# Patient Record
Sex: MALE | Race: WHITE | NOT HISPANIC OR LATINO | Employment: FULL TIME | URBAN - METROPOLITAN AREA
[De-identification: names, ages, dates, MRNs, and addresses within clinical notes are randomized per-mention and may not be internally consistent; named-entity substitution may affect disease eponyms.]

---

## 2017-01-02 ENCOUNTER — PATIENT MESSAGE (OUTPATIENT)
Dept: ADMINISTRATIVE | Facility: OTHER | Age: 42
End: 2017-01-02

## 2017-03-15 DIAGNOSIS — Z00.00 ROUTINE GENERAL MEDICAL EXAMINATION AT A HEALTH CARE FACILITY: Primary | ICD-10-CM

## 2017-05-18 DIAGNOSIS — Z00.00 ROUTINE GENERAL MEDICAL EXAMINATION AT A HEALTH CARE FACILITY: Primary | ICD-10-CM

## 2017-07-14 ENCOUNTER — TELEPHONE (OUTPATIENT)
Dept: INTERNAL MEDICINE | Facility: CLINIC | Age: 42
End: 2017-07-14

## 2017-07-14 ENCOUNTER — CLINICAL SUPPORT (OUTPATIENT)
Dept: INTERNAL MEDICINE | Facility: CLINIC | Age: 42
End: 2017-07-14

## 2017-07-14 ENCOUNTER — OFFICE VISIT (OUTPATIENT)
Dept: INTERNAL MEDICINE | Facility: CLINIC | Age: 42
End: 2017-07-14

## 2017-07-14 ENCOUNTER — HOSPITAL ENCOUNTER (OUTPATIENT)
Dept: RADIOLOGY | Facility: HOSPITAL | Age: 42
Discharge: HOME | End: 2017-07-14
Attending: FAMILY MEDICINE

## 2017-07-14 ENCOUNTER — CLINICAL SUPPORT (OUTPATIENT)
Dept: REHABILITATION | Facility: HOSPITAL | Age: 42
End: 2017-07-14
Attending: FAMILY MEDICINE

## 2017-07-14 VITALS
WEIGHT: 211 LBS | HEART RATE: 64 BPM | BODY MASS INDEX: 29.54 KG/M2 | HEIGHT: 71 IN | TEMPERATURE: 97 F | SYSTOLIC BLOOD PRESSURE: 112 MMHG | DIASTOLIC BLOOD PRESSURE: 76 MMHG

## 2017-07-14 VITALS
BODY MASS INDEX: 29.63 KG/M2 | WEIGHT: 211.63 LBS | SYSTOLIC BLOOD PRESSURE: 112 MMHG | HEART RATE: 64 BPM | RESPIRATION RATE: 14 BRPM | HEIGHT: 71 IN | DIASTOLIC BLOOD PRESSURE: 76 MMHG

## 2017-07-14 DIAGNOSIS — Z00.00 ROUTINE GENERAL MEDICAL EXAMINATION AT A HEALTH CARE FACILITY: ICD-10-CM

## 2017-07-14 DIAGNOSIS — Z00.00 ROUTINE GENERAL MEDICAL EXAMINATION AT A HEALTH CARE FACILITY: Primary | ICD-10-CM

## 2017-07-14 DIAGNOSIS — Z00.00 ROUTINE HEALTH MAINTENANCE: Primary | ICD-10-CM

## 2017-07-14 LAB
ALBUMIN SERPL BCP-MCNC: 4.1 G/DL
ALP SERPL-CCNC: 69 U/L
ALT SERPL W/O P-5'-P-CCNC: 44 U/L
ANION GAP SERPL CALC-SCNC: 6 MMOL/L
AST SERPL-CCNC: 23 U/L
BILIRUB SERPL-MCNC: 0.9 MG/DL
BUN SERPL-MCNC: 16 MG/DL
CALCIUM SERPL-MCNC: 9.5 MG/DL
CHLORIDE SERPL-SCNC: 104 MMOL/L
CHOLEST/HDLC SERPL: 5.4 {RATIO}
CO2 SERPL-SCNC: 28 MMOL/L
COMPLEXED PSA SERPL-MCNC: 0.64 NG/ML
CREAT SERPL-MCNC: 1.2 MG/DL
ERYTHROCYTE [DISTWIDTH] IN BLOOD BY AUTOMATED COUNT: 12.4 %
EST. GFR  (AFRICAN AMERICAN): >60 ML/MIN/1.73 M^2
EST. GFR  (NON AFRICAN AMERICAN): >60 ML/MIN/1.73 M^2
ESTIMATED AVG GLUCOSE: 103 MG/DL
GLUCOSE SERPL-MCNC: 100 MG/DL
HBA1C MFR BLD HPLC: 5.2 %
HCT VFR BLD AUTO: 42.4 %
HDL/CHOLESTEROL RATIO: 18.7 %
HDLC SERPL-MCNC: 182 MG/DL
HDLC SERPL-MCNC: 34 MG/DL
HGB BLD-MCNC: 14.7 G/DL
LDLC SERPL CALC-MCNC: 111 MG/DL
MCH RBC QN AUTO: 29.9 PG
MCHC RBC AUTO-ENTMCNC: 34.7 %
MCV RBC AUTO: 86 FL
NONHDLC SERPL-MCNC: 148 MG/DL
PLATELET # BLD AUTO: 193 K/UL
PMV BLD AUTO: 10.5 FL
POTASSIUM SERPL-SCNC: 4.6 MMOL/L
PROT SERPL-MCNC: 7.2 G/DL
RBC # BLD AUTO: 4.92 M/UL
SODIUM SERPL-SCNC: 138 MMOL/L
TRIGL SERPL-MCNC: 185 MG/DL
TSH SERPL DL<=0.005 MIU/L-ACNC: 1.01 UIU/ML
WBC # BLD AUTO: 5.36 K/UL

## 2017-07-14 PROCEDURE — 84443 ASSAY THYROID STIM HORMONE: CPT

## 2017-07-14 PROCEDURE — 80053 COMPREHEN METABOLIC PANEL: CPT | Mod: PO

## 2017-07-14 PROCEDURE — 97750 PHYSICAL PERFORMANCE TEST: CPT | Performed by: PHYSICAL THERAPIST

## 2017-07-14 PROCEDURE — 80061 LIPID PANEL: CPT | Mod: PO

## 2017-07-14 PROCEDURE — 84153 ASSAY OF PSA TOTAL: CPT

## 2017-07-14 PROCEDURE — 75571 CT HRT W/O DYE W/CA TEST: CPT | Mod: TC,PO

## 2017-07-14 PROCEDURE — 85027 COMPLETE CBC AUTOMATED: CPT | Mod: PO

## 2017-07-14 PROCEDURE — 83036 HEMOGLOBIN GLYCOSYLATED A1C: CPT

## 2017-07-14 PROCEDURE — 99396 PREV VISIT EST AGE 40-64: CPT | Mod: ,,, | Performed by: FAMILY MEDICINE

## 2017-07-14 PROCEDURE — 99999 PR PBB SHADOW E&M-EST. PATIENT-LVL II: CPT | Mod: PBBFAC,,, | Performed by: FAMILY MEDICINE

## 2017-07-14 PROCEDURE — 97802 MEDICAL NUTRITION INDIV IN: CPT | Mod: S$GLB,,, | Performed by: INTERNAL MEDICINE

## 2017-07-14 PROCEDURE — 75571 CT HRT W/O DYE W/CA TEST: CPT | Mod: 26,,, | Performed by: RADIOLOGY

## 2017-07-14 NOTE — PROGRESS NOTES
Nutrition Assessment  Client name:  Gideon Ribera Jr.  :  1975  Age:  42 y.o.  Gender:  male    Client states he is here today with his wife for his annual Executive Health medical examination. He admits he has not been as physically active this year as he has in the past. He is very open to discuss how nutrition can benefit his health through a bit of weight loss. His diet consists mainly of what his wife cooks for him (dinner and lunch leftovers) and feels his downfall is late night snacking.     Past Medical History:   Diagnosis Date    Castano esophagus     last egd -  looks normal    GERD (gastroesophageal reflux disease)        Social History    Marital status:    Employment:  [employer]  Social History   Substance Use Topics    Smoking status: Never Smoker    Smokeless tobacco: Never Used    Alcohol use Yes      Comment: occas etoh use        Current medications:  has a current medication list which includes the following prescription(s): pantoprazole.  Vitamins, minerals, and/or supplements:  multivitamin   Food allergies or intolerances:  NKFA     Food History  Breakfast:  Kashi Waffles and Banana + Coffee w milk  Mid-morning Snack:  granola Bar (Nature Vallley w Protein)  Lunch:  Brings lunch from home (leftovers: meat and veg / casserole) + water  Mid-afternoon Snack:  granola bar / banana  Dinner:  Home-cooked meal: Meat, vegetable / casserole  H.S. Snack:  banana/ granola bar/ cookies    Exercise History:  Moderately Active. Walks sporadically    Lab Reports   Total Cholesterol:  182    Triglycerides:  185  HDL:  34  LDL:  111.0   Glucose:  100  HbA1c:  N/A  BP:  112/76     Weight History  Height:  71 inches     Weight:  211 pounds  BMI:  29.5  % Body Fat:  21.86    Diagnosis  RMR (Method:  Body [place]):  1810 kcal  Activity Factor:  1.2  KYLAH:  2100    Altered nutrition-related lab values related to irregular physical activity and late night consumption of high carb, high kcal foods  as evidenced by Triglycerides 185, HDL 34.    Intervention    Goals:  1.  Adhere to a 2100 kcal consistent-carbohydrate diet of three meals and three snacks.  2.  Increase physical activity in daily life.  3.  Limit high-sugar foods after dinner.    Discussed with client basic nutrition principles to adhere to a more consistent-carbohydrate diet. Educated him on macronutrient distribution for meals and snacks and provided him with specific samples of snacks that can fit criteria. Encouraged him to live a more active lifestyle.     Handouts provided:  Meal Planning Guide  Restaurant Guide  Eat Fit Shopping List  Eat Fit Carmencita  Fast Food Guide  Vitamin/Mineral Guide    Monitoring/Evaluation    Monitor the following:  Weight  BMI  % Body Fat  Caloric intake  Labs:  Trig, HDL.    Follow Up Plan:  Follow up with client in 1-2 years

## 2017-07-14 NOTE — PROGRESS NOTES
Subjective:       Patient ID: Gideon Ribera Jr. is a 42 y.o. male.    Chief Complaint: exec health phys    HPIhere for exec health phys. No new issues.   Past Medical History:   Diagnosis Date    Castano esophagus     last egd - 2014 looks normal    GERD (gastroesophageal reflux disease)      Past Surgical History:   Procedure Laterality Date    egd  2014     Family History   Problem Relation Age of Onset    Crohn's disease Mother     Cancer Father      mantle cell lymphoma at 60    Diabetes Maternal Grandmother     Diabetes Paternal Grandmother     Diabetes Paternal Grandfather     Prostate cancer Paternal Grandfather      Social History     Social History    Marital status:      Spouse name: N/A    Number of children: 1    Years of education: N/A     Occupational History    [email or web address]          Social History Main Topics    Smoking status: Never Smoker    Smokeless tobacco: Never Used    Alcohol use Yes      Comment: occas etoh use    Drug use: No    Sexual activity: Yes     Partners: Female     Other Topics Concern    None     Social History Narrative     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 He appears well-developed and well-nourished.   HENT:   Head: Normocephalic and atraumatic.   Right Ear: External ear normal.   Left Ear: External ear normal.   Nose: Nose normal.   Mouth/Throat: Oropharynx is clear and moist.   Nl tms   Eyes: Conjunctivae and EOM are normal. Pupils are equal, round, and reactive to light. No scleral icterus.   Neck: Normal range of motion. Neck supple. Carotid bruit is not present.   Cardiovascular: Normal rate, regular rhythm and normal heart sounds.  Exam reveals no gallop and no friction rub.     No murmur heard.  Pulmonary/Chest: Effort normal and breath sounds normal. He has no wheezes.   Abdominal: Soft. Bowel sounds are normal. He exhibits no distension and no mass. There is no hepatosplenomegaly. There is no tenderness. There is no rebound and no guarding.   Musculoskeletal: Normal range of motion. He exhibits no tenderness.   Lymphadenopathy:     He has no cervical adenopathy.   Neurological: He is alert and oriented to person, place, and time. No cranial nerve deficit. Coordination normal.   Skin: Skin is warm and dry. No rash noted. No erythema.   Psychiatric: He has a normal mood and affect. His behavior is normal. Judgment and thought content normal.   Nursing note and vitals reviewed.      Assessment:       1. Routine health maintenance      hx barretts  Plan:       **d/wd egd due 12/17  F/u annually  exerc /wt loss discussed  Letter after rsults*  [facility] #[id number]    Dear Mr. Ribera:    It was a pleasure to meet with you again to perform this year's executive   physical examination.  At the time of your visit, you had no specific   complaints.    PAST MEDICAL HISTORY:  Castano's esophagus, last EGD in 2014 looks normal, GERD.    PAST SURGICAL HISTORY:  EGD in 2014.    FAMILY HISTORY:  Mother with Crohn's disease.  Father with history of mantle   cell lymphoma at 60.  Diabetes in grandparents.  Prostate cancer in paternal   grandfather.    SOCIAL HISTORY:   with [employer].   with child.    HABITS:  Nonsmoker.    IMMUNIZATIONS:  Tetanus, whooping cough booster given July 2016.    MEDICATIONS:  Protonix.    ALLERGIES:  No known drug allergies.    REVIEW OF SYSTEMS:  CARDIOVASCULAR:  No chest pain.  CHEST:  No shortness of breath.  GASTROINTESTINAL:  No abdominal pain.  Remainder of review of systems are negative.    PHYSICAL EXAMINATION:  VITAL SIGNS:  Blood pressure 112/76, temperature 97, heart rate 64 beats per   minute, weight 211 pounds, height 5 feet 11 inches with a body  mass index of   29.43 where a BMI of 30 or higher indicates obesity.  GENERAL:  Alert and oriented, pleasant.  HEAD AND NECK:  Both eardrums and ear canals are clear.  Nasal passage is clear.    Oropharyngeal exam without redness, lesions or exudate.  NECK:  Supple.  No lymph node enlargement.  No masses.  No carotid bruit.  HEART:  Regular rate and rhythm.  CHEST:  Clear bilaterally.  ABDOMEN:  Positive bowel sounds, soft, nontender, nondistended.  No organ   enlargement.  No masses.  MUSCULOSKELETAL:  No focal joint pains or swelling.  NEUROLOGIC:  Cranial nerves II-XII are intact.  Motor 5/5 in both upper and   lower extremities.  Symmetrical reflexes.    LABORATORY DATA:  PSA, prostate cancer screen test normal at 0.64.  Kidney   function test, creatinine normal at 1.2.  Liver function test:  AST and ALT both   normal.  White blood cell count normal at 5.36 indicating no leukemia or   obvious infection.  Hematocrit red blood cell count 42.4 indicating no anemia.    Total cholesterol 182, improved from last year's 192, triglycerides a bit   elevated at 185, a goal of less than 150.  HDL or good cholesterol low at 34,   stable.  LDL or bad cholesterol normal at 111.  TSH or thyroid test 1.    Hemoglobin A1c 5.2.  Your three-month sugar average, indicating no diabetes.    Coronary calcium scan shows Agatston score of 6, which is minimal to no heart   disease likely.    IMPRESSION:  1.  Executive physical examination.  2.  History of Castano's esophagus with controlled GERD.  3.  Borderline obesity.    PLAN:  1.  Exercise, weight loss.  2.  Discussed EGD or upper endoscopy due December 2017 to monitor for recurrence   of Castano's esophagus.  3.  Follow up annually.    It was a pleasure to meet with you again and perform your executive physical   examination.  If you have any further questions, please contact me.    Sincerely,    MD DANNIELLE Muro/PRIETO  dd: 08/10/2017 08:51:51 (CDT)  td: 08/10/2017 14:28:07  (CDT)  Doc ID   #[id number]  Job ID #[id number]    CC:

## 2017-07-14 NOTE — PROGRESS NOTES
:  77    DX: v70.0  Orders:  Fitness Evaluation    Patient's 4th [facility] Fitness Component evaluation was completed.  Results are as follows:    Height (in):    71                            Weight (lbs):    211                                    BMI:   29.5    Resting Energy Expenditure:         1810       kcal/24 hrs.              Estimated:    1889     REE measured post treadmill:          No   REE measured fasting:       Yes           Body Composition:          21.86  % body fat             Rating: Good    Waist to Hip Ratio:     0.88                    Risk:  Low   Hip taken over clothing:      Yes          Muscular Strength and Endurance Assessment:               Strength (lbs):     Right: 139.7             Rating:  Above Average      Left:  125.3           Rating: Average   Push-ups:   32                 Rating:  Excellent   Curl-ups :   31                Rating:  Below Average    Flexibility Testing:   Sit and Reach (cm):    20                       Rating:  Fair    The patient tolerated the testing procedures well.

## 2018-03-26 DIAGNOSIS — Z00.00 ROUTINE GENERAL MEDICAL EXAMINATION AT A HEALTH CARE FACILITY: Primary | ICD-10-CM

## 2018-05-22 ENCOUNTER — PATIENT OUTREACH (OUTPATIENT)
Dept: ADMINISTRATIVE | Facility: HOSPITAL | Age: 43
End: 2018-05-22

## 2018-06-01 ENCOUNTER — PATIENT MESSAGE (OUTPATIENT)
Dept: GASTROENTEROLOGY | Facility: CLINIC | Age: 43
End: 2018-06-01

## 2018-06-01 ENCOUNTER — NUTRITION (OUTPATIENT)
Dept: DIABETES | Facility: CLINIC | Age: 43
End: 2018-06-01

## 2018-06-01 ENCOUNTER — CLINICAL SUPPORT (OUTPATIENT)
Dept: CARDIOLOGY | Facility: CLINIC | Age: 43
End: 2018-06-01

## 2018-06-01 ENCOUNTER — OFFICE VISIT (OUTPATIENT)
Dept: INTERNAL MEDICINE | Facility: CLINIC | Age: 43
End: 2018-06-01

## 2018-06-01 ENCOUNTER — CLINICAL SUPPORT (OUTPATIENT)
Dept: INTERNAL MEDICINE | Facility: CLINIC | Age: 43
End: 2018-06-01
Payer: COMMERCIAL

## 2018-06-01 VITALS
DIASTOLIC BLOOD PRESSURE: 70 MMHG | HEIGHT: 71 IN | HEART RATE: 60 BPM | WEIGHT: 205.69 LBS | RESPIRATION RATE: 16 BRPM | SYSTOLIC BLOOD PRESSURE: 108 MMHG | BODY MASS INDEX: 28.8 KG/M2

## 2018-06-01 VITALS
OXYGEN SATURATION: 99 % | HEIGHT: 71 IN | TEMPERATURE: 97 F | BODY MASS INDEX: 28.8 KG/M2 | DIASTOLIC BLOOD PRESSURE: 70 MMHG | HEART RATE: 60 BPM | WEIGHT: 205.69 LBS | RESPIRATION RATE: 14 BRPM | SYSTOLIC BLOOD PRESSURE: 108 MMHG

## 2018-06-01 VITALS — WEIGHT: 207.25 LBS | BODY MASS INDEX: 29.02 KG/M2 | HEIGHT: 71 IN

## 2018-06-01 DIAGNOSIS — K22.70 BARRETT'S ESOPHAGUS WITHOUT DYSPLASIA: ICD-10-CM

## 2018-06-01 DIAGNOSIS — Z00.8 NUTRITIONAL ASSESSMENT: Primary | ICD-10-CM

## 2018-06-01 DIAGNOSIS — Z00.00 ROUTINE GENERAL MEDICAL EXAMINATION AT A HEALTH CARE FACILITY: ICD-10-CM

## 2018-06-01 DIAGNOSIS — K21.9 GASTROESOPHAGEAL REFLUX DISEASE WITHOUT ESOPHAGITIS: ICD-10-CM

## 2018-06-01 DIAGNOSIS — Z00.00 ROUTINE GENERAL MEDICAL EXAMINATION AT A HEALTH CARE FACILITY: Primary | ICD-10-CM

## 2018-06-01 LAB
ALBUMIN SERPL BCP-MCNC: 4.2 G/DL
ALP SERPL-CCNC: 69 U/L
ALT SERPL W/O P-5'-P-CCNC: 15 U/L
ANION GAP SERPL CALC-SCNC: 8 MMOL/L
AST SERPL-CCNC: 17 U/L
BILIRUB SERPL-MCNC: 0.7 MG/DL
BUN SERPL-MCNC: 16 MG/DL
CALCIUM SERPL-MCNC: 9.6 MG/DL
CHLORIDE SERPL-SCNC: 106 MMOL/L
CHOLEST SERPL-MCNC: 163 MG/DL
CHOLEST/HDLC SERPL: 4.5 {RATIO}
CO2 SERPL-SCNC: 26 MMOL/L
COMPLEXED PSA SERPL-MCNC: 0.86 NG/ML
CREAT SERPL-MCNC: 1.2 MG/DL
ERYTHROCYTE [DISTWIDTH] IN BLOOD BY AUTOMATED COUNT: 12.4 %
EST. GFR  (AFRICAN AMERICAN): >60 ML/MIN/1.73 M^2
EST. GFR  (NON AFRICAN AMERICAN): >60 ML/MIN/1.73 M^2
ESTIMATED AVG GLUCOSE: 103 MG/DL
GLUCOSE SERPL-MCNC: 97 MG/DL
HBA1C MFR BLD HPLC: 5.2 %
HCT VFR BLD AUTO: 41.8 %
HDLC SERPL-MCNC: 36 MG/DL
HDLC SERPL: 22.1 %
HGB BLD-MCNC: 14.6 G/DL
LDLC SERPL CALC-MCNC: 105.8 MG/DL
MCH RBC QN AUTO: 30.4 PG
MCHC RBC AUTO-ENTMCNC: 34.9 G/DL
MCV RBC AUTO: 87 FL
NONHDLC SERPL-MCNC: 127 MG/DL
PLATELET # BLD AUTO: 191 K/UL
PMV BLD AUTO: 11.4 FL
POTASSIUM SERPL-SCNC: 4.2 MMOL/L
PROT SERPL-MCNC: 7.1 G/DL
RBC # BLD AUTO: 4.8 M/UL
SODIUM SERPL-SCNC: 140 MMOL/L
TRIGL SERPL-MCNC: 106 MG/DL
TSH SERPL DL<=0.005 MIU/L-ACNC: 0.82 UIU/ML
WBC # BLD AUTO: 4.79 K/UL

## 2018-06-01 PROCEDURE — 85027 COMPLETE CBC AUTOMATED: CPT | Mod: PO

## 2018-06-01 PROCEDURE — 80061 LIPID PANEL: CPT | Mod: PO

## 2018-06-01 PROCEDURE — 83036 HEMOGLOBIN GLYCOSYLATED A1C: CPT

## 2018-06-01 PROCEDURE — 93000 ELECTROCARDIOGRAM COMPLETE: CPT | Mod: ,,, | Performed by: INTERNAL MEDICINE

## 2018-06-01 PROCEDURE — 84153 ASSAY OF PSA TOTAL: CPT

## 2018-06-01 PROCEDURE — 99999 PR PBB SHADOW E&M-EST. PATIENT-LVL II: CPT | Mod: PBBFAC,,, | Performed by: DIETITIAN, REGISTERED

## 2018-06-01 PROCEDURE — 80053 COMPREHEN METABOLIC PANEL: CPT | Mod: PO

## 2018-06-01 PROCEDURE — 99999 PR PBB SHADOW E&M-EST. PATIENT-LVL III: CPT | Mod: PBBFAC,,, | Performed by: INTERNAL MEDICINE

## 2018-06-01 PROCEDURE — 99386 PREV VISIT NEW AGE 40-64: CPT | Mod: ,,, | Performed by: INTERNAL MEDICINE

## 2018-06-01 PROCEDURE — 97802 MEDICAL NUTRITION INDIV IN: CPT | Mod: ,,, | Performed by: DIETITIAN, REGISTERED

## 2018-06-01 PROCEDURE — 84443 ASSAY THYROID STIM HORMONE: CPT

## 2018-06-01 NOTE — PROGRESS NOTES
"PCP: Josesito Dunn MD   REFERRING PROVIDER: No ref. provider found     HISTORY OF PRESENT ILLNESS: 43 y.o. male patient is in clinic today for executive health visit.    No weight changes reported.     VITAL SIGNS:  Height: 5' 11" (180.3 cm)   Weight: 94 kg (207 lb 3.7 oz)   IBW: 172 lbs +/-10%    ALLERGIES & MEDICATIONS: Reviewed and Reconciled  MEDICAL/SURGICAL & FAMILY HISTORY: Reviewed and Reconciled    LABORATORY:  Reviewed Diabetes Management Flowsheet and Results Review.    SELF-MONITORING: Food - none are avail    ACTIVITY LEVEL: sporadic - some weeks better than others    NUTRITION INTAKE: Meal patterns include 3 meals, 1-2 snacks daily with intake ~1153-2305 cals/d.   B - yogurt, oatmeal - coffee w/ creamer  S - none  L - steak marsala @ [employer], mashed potato - uns tea  S - none  D - 2 Hawaiian roll meatball sandwiches - water  S - none  Beverages - unsweetened tea, water  Dining out - 2-3x/week    PSYCHOSOCIAL: Stage of change - contemplation  Barriers to change - readiness.    EDUCATIONAL ASSESSMENT:  Patient needs improvement in self care management skills:    Healthy Eating   Being Active   Monitoring  Taking medications  Problem solving  Healthy coping  Reducing risks    MNT ASSESSMENT:   1800 calories, 75-85 grams protein daily  45 grams carb/meal, 5-15 grams carb/snack  increase fruit 2 serv/d, vegetables 2+ cups/d, whole grains 3+serv/d, lowfat dairy 3+serv/d  low-fat, low-sodium  150 min physical activity per week, moderate intensity, as tolerated    PLAN:   Reviewed MNT guidelines for general wellness.    Encouraged daily self-monitoring of food and activity patterns, return records to clinic.   Provided meal-planning instruction via foodlists, plate method, food models, food labels and/or ADA booklet. Reviewed micro/macronutrient effect on health management. Discussed carbohydrate counting and spacing techniques with emphasis on cardiovascular wellness health strategies, functional " foods. Reviewed principles of energy metabolism to assist weight and health management.    Discussed importance of daily physical activity with review of benefits, methods, guidelines and precautions.   Discussed behavioral strategies for improving social and environmental support of lifestyle changes.    GOALS: Self-monitoring: Daily food & activity journal. Meal Plan: 90% Accuracy. Activity:150 min/wk.    Visit Time Spent:  60 minutes  Thank you for the opportunity to work with your patient.

## 2018-06-01 NOTE — PROGRESS NOTES
"Subjective:      Patient ID: Gideon Ribera Jr. is a 43 y.o. male.    Chief Complaint: Executive Health    It was a pleasure to meet you for your Executive Health  Physical on 18.  You are a 43 year-old  gentleman with a past medical history of acid reflux  with Castano's esophagus.  Your last EGD was done in   and you are due for a repeat.  You are  currently working as an  for [employer].  You are   with one healthy child.  You do not smoke and  you occasionally drink alcohol.  Your current  medications include Protonix only.  You have no known  drug allergies.     Your family history includes a father who was recently   at 61 years old with mantle cell lymphoma.   Your mother is 61 years old with prediabetes and Crohn  disease.  Your maternal grandmother and paternal  grandmothers had a history of diabetes.  Your paternal  grandfather had diabetes and prostate cancer.     HEALTH MAINTENANCE:  You usually receive your annual  flu vaccine and your tetanus, diphtheria, and whooping  cough vaccine was updated on 17     due for egd due to Castano's  Review of Systems   Constitutional: Negative for chills and fever.   HENT: Negative for ear pain and sore throat.    Respiratory: Negative for cough.    Cardiovascular: Negative for chest pain.   Gastrointestinal: Negative for abdominal pain and blood in stool.   Genitourinary: Negative for dysuria and hematuria.   Neurological: Negative for seizures and syncope.     Objective:   /70 (BP Location: Right arm, Patient Position: Sitting)   Pulse 60   Temp 96.6 °F (35.9 °C) (Tympanic)   Resp 14   Ht 5' 11" (1.803 m)   Wt 93.3 kg (205 lb 11 oz)   SpO2 99%   BMI 28.69 kg/m²     Physical Exam   Constitutional: He is oriented to person, place, and time. He appears well-developed and well-nourished. No distress.   HENT:   Head: Normocephalic and atraumatic.   Mouth/Throat: Oropharynx is clear and moist.   Eyes: EOM are normal. Pupils " are equal, round, and reactive to light.   Neck: Neck supple. No thyromegaly present.   Cardiovascular: Normal rate and regular rhythm.    Pulmonary/Chest: Breath sounds normal. He has no wheezes. He has no rales.   Abdominal: Soft. Bowel sounds are normal. There is no tenderness.   Musculoskeletal: He exhibits no edema.   Lymphadenopathy:     He has no cervical adenopathy.   Neurological: He is alert and oriented to person, place, and time.   Skin: Skin is warm and dry.   Psychiatric: He has a normal mood and affect. His behavior is normal.       Assessment:     1. Routine general medical examination at a health care facility    2. Gastroesophageal reflux disease without esophagitis    3. Castano's esophagus without dysplasia      Plan:   Routine general medical examination at a health care facility    Gastroesophageal reflux disease without esophagitis    Castano's esophagus without dysplasia  -     Case request GI: ESOPHAGOGASTRODUODENOSCOPY (EGD)    Heart healthy diet and reg exercise   reviewed    Lab Frequency Next Occurrence       Problem List Items Addressed This Visit        GI    GERD (gastroesophageal reflux disease)      Other Visit Diagnoses     Routine general medical examination at a health care facility    -  Primary    Castano's esophagus without dysplasia        Relevant Orders    Case request GI: ESOPHAGOGASTRODUODENOSCOPY (EGD) (Completed)          Follow-up if symptoms worsen or fail to improve.

## 2018-07-27 ENCOUNTER — ANESTHESIA EVENT (OUTPATIENT)
Dept: ENDOSCOPY | Facility: HOSPITAL | Age: 43
End: 2018-07-27
Payer: COMMERCIAL

## 2018-07-27 ENCOUNTER — ANESTHESIA (OUTPATIENT)
Dept: ENDOSCOPY | Facility: HOSPITAL | Age: 43
End: 2018-07-27
Payer: COMMERCIAL

## 2018-07-27 ENCOUNTER — SURGERY (OUTPATIENT)
Age: 43
End: 2018-07-27

## 2018-07-27 ENCOUNTER — HOSPITAL ENCOUNTER (OUTPATIENT)
Facility: HOSPITAL | Age: 43
Discharge: HOME | End: 2018-07-27
Attending: INTERNAL MEDICINE | Admitting: INTERNAL MEDICINE
Payer: COMMERCIAL

## 2018-07-27 VITALS
HEART RATE: 71 BPM | DIASTOLIC BLOOD PRESSURE: 77 MMHG | BODY MASS INDEX: 28.84 KG/M2 | WEIGHT: 206 LBS | HEIGHT: 71 IN | OXYGEN SATURATION: 97 % | RESPIRATION RATE: 18 BRPM | TEMPERATURE: 98 F | SYSTOLIC BLOOD PRESSURE: 115 MMHG

## 2018-07-27 DIAGNOSIS — K22.70 BARRETT'S ESOPHAGUS: ICD-10-CM

## 2018-07-27 PROCEDURE — 37000008 HC ANESTHESIA 1ST 15 MINUTES: Performed by: INTERNAL MEDICINE

## 2018-07-27 PROCEDURE — 43239 EGD BIOPSY SINGLE/MULTIPLE: CPT | Mod: ,,, | Performed by: INTERNAL MEDICINE

## 2018-07-27 PROCEDURE — 88305 TISSUE EXAM BY PATHOLOGIST: CPT | Mod: 26,,, | Performed by: PATHOLOGY

## 2018-07-27 PROCEDURE — 37000009 HC ANESTHESIA EA ADD 15 MINS: Performed by: INTERNAL MEDICINE

## 2018-07-27 PROCEDURE — 27201012 HC FORCEPS, HOT/COLD, DISP: Performed by: INTERNAL MEDICINE

## 2018-07-27 PROCEDURE — 88305 TISSUE EXAM BY PATHOLOGIST: CPT | Performed by: PATHOLOGY

## 2018-07-27 PROCEDURE — 25000003 PHARM REV CODE 250: Performed by: INTERNAL MEDICINE

## 2018-07-27 PROCEDURE — 43239 EGD BIOPSY SINGLE/MULTIPLE: CPT | Performed by: INTERNAL MEDICINE

## 2018-07-27 PROCEDURE — 63600175 PHARM REV CODE 636 W HCPCS: Performed by: NURSE ANESTHETIST, CERTIFIED REGISTERED

## 2018-07-27 RX ORDER — SODIUM CHLORIDE 0.9 % (FLUSH) 0.9 %
3 SYRINGE (ML) INJECTION
Status: DISCONTINUED | OUTPATIENT
Start: 2018-07-27 | End: 2018-07-27 | Stop reason: HOSPADM

## 2018-07-27 RX ORDER — LIDOCAINE HCL/PF 100 MG/5ML
SYRINGE (ML) INTRAVENOUS
Status: DISCONTINUED | OUTPATIENT
Start: 2018-07-27 | End: 2018-07-27

## 2018-07-27 RX ORDER — PROPOFOL 10 MG/ML
INJECTION, EMULSION INTRAVENOUS
Status: DISCONTINUED | OUTPATIENT
Start: 2018-07-27 | End: 2018-07-27

## 2018-07-27 RX ORDER — SODIUM CHLORIDE, SODIUM LACTATE, POTASSIUM CHLORIDE, CALCIUM CHLORIDE 600; 310; 30; 20 MG/100ML; MG/100ML; MG/100ML; MG/100ML
INJECTION, SOLUTION INTRAVENOUS CONTINUOUS
Status: DISCONTINUED | OUTPATIENT
Start: 2018-07-27 | End: 2018-07-27 | Stop reason: HOSPADM

## 2018-07-27 RX ADMIN — LIDOCAINE HYDROCHLORIDE 100 MG: 20 INJECTION, SOLUTION INTRAVENOUS at 09:07

## 2018-07-27 RX ADMIN — PROPOFOL 60 MG: 10 INJECTION, EMULSION INTRAVENOUS at 09:07

## 2018-07-27 RX ADMIN — SODIUM CHLORIDE, SODIUM LACTATE, POTASSIUM CHLORIDE, AND CALCIUM CHLORIDE: 600; 310; 30; 20 INJECTION, SOLUTION INTRAVENOUS at 09:07

## 2018-07-27 RX ADMIN — PROPOFOL 130 MG: 10 INJECTION, EMULSION INTRAVENOUS at 09:07

## 2018-07-27 RX ADMIN — PROPOFOL 60 MG: 10 INJECTION, EMULSION INTRAVENOUS at 10:07

## 2018-07-27 RX ADMIN — SODIUM CHLORIDE, SODIUM LACTATE, POTASSIUM CHLORIDE, AND CALCIUM CHLORIDE: 600; 310; 30; 20 INJECTION, SOLUTION INTRAVENOUS at 08:07

## 2018-07-27 RX ADMIN — PROPOFOL 70 MG: 10 INJECTION, EMULSION INTRAVENOUS at 09:07

## 2018-07-27 NOTE — INTERVAL H&P NOTE
The patient has been examined and the H&P has been reviewed:I have reviewed this note and I agree with this assessment. The patient remains stable for endoscopy at the time of this present evaluation.         Anesthesia/Surgery risks, benefits and alternative options discussed and understood by patient/family.          Active Hospital Problems    Diagnosis  POA    Castano's esophagus [K22.70]  Yes      Resolved Hospital Problems    Diagnosis Date Resolved POA   No resolved problems to display.

## 2018-07-27 NOTE — H&P
Short Stay Endoscopy History and Physical    PCP - Josesito Dunn MD    Procedure - EGD  ASA - 2  Mallampati - per anesthesia  History of Anesthesia problems - no  Family history Anesthesia problems -  no     HPI:  This is a 43 y.o.male here for evaluation of : Castano's Esophagus    Reflux - yes  Dysphagia - no  Abdominal pain - no  Diarrhea - no  Anemia - no  GI bleeding - no  Nausea and vomiting-no  Early satiety-no  aversion to sight or smell of food-no    ROS:  Constitutional: No fevers, chills, No weight loss  ENT: No allergies  CV: No chest pain  Pulm: No cough, No shortness of breath  Ophtho: No vision changes  GI: see HPI  Derm: No rash  Heme: No lymphadenopathy, No bruising  MSK: No arthritis  : No dysuria, No hematuria  Endo: No hot or cold intolerance  Neuro: No syncope, No seizure  Psych: No anxiety, No depression    Medical History:  Past Medical History:   Diagnosis Date    Castano esophagus     last egd - 2014 looks normal    GERD (gastroesophageal reflux disease)        Surgical History:  Past Surgical History:   Procedure Laterality Date    egd  2014       Family History:  Family History   Problem Relation Age of Onset    Crohn's disease Mother     Cancer Father         mantle cell lymphoma at 60    Diabetes Maternal Grandmother     Diabetes Paternal Grandmother     Diabetes Paternal Grandfather     Prostate cancer Paternal Grandfather        Social History:  Social History     Social History    Marital status:      Spouse name: N/A    Number of children: 1    Years of education: N/A     Occupational History    [email or web address]          Social History Main Topics    Smoking status: Never Smoker    Smokeless tobacco: Never Used    Alcohol use Yes      Comment: occas etoh use    Drug use: No    Sexual activity: Yes     Partners: Female     Other Topics Concern    Not on file     Social History Narrative     with one child and works as an  ; [employer]       Allergies: Review of patient's allergies indicates:  No Known Allergies    Medications:   No current facility-administered medications on file prior to encounter.      Current Outpatient Prescriptions on File Prior to Encounter   Medication Sig Dispense Refill    pantoprazole (PROTONIX) 20 MG tablet Take 2 tablets (40 mg total) by mouth once daily. (Patient taking differently: Take 20 mg by mouth as needed. ) 30 tablet 11       Objective Findings:    Vital Signs:There were no vitals filed for this visit.        Physical Exam:  General Appearance: Well appearing in no acute distress  Eyes:    No scleral icterus  ENT: Neck supple, Lips, mucosa, and tongue normal; teeth and gums normal  Lungs: CTA bilaterally in anterior and posterior fields, no wheezes, no crackles.  Heart:  Regular rate, S1, S2 normal, no murmurs heard.  Abdomen: Soft, non tender, non distended with normal bowel sounds. No hepatosplenomegaly, ascites, or mass.  Extremities: No clubbing, cyanosis or edema  Skin: No rash    Labs:  Reviewed    Plan:EGD  I have explained the risks and benefits of endoscopy procedures to the patient including but not limited to bleeding, perforation, infection, and death. The patient wishes to proceed.

## 2018-07-27 NOTE — ANESTHESIA RELEASE NOTE
"Anesthesia Release from PACU Note    Patient: Gideon Ribera Jr.    Procedure(s) Performed: Procedure(s) (LRB):  ESOPHAGOGASTRODUODENOSCOPY (EGD) (N/A)    Anesthesia type: MAC    Post pain: Adequate analgesia    Post assessment: no apparent anesthetic complications, tolerated procedure well and no evidence of recall    Last Vitals:   Visit Vitals  /62 (BP Location: Left arm, Patient Position: Lying)   Pulse 76   Temp 36.8 °C (98.2 °F) (Oral)   Resp 16   Ht 5' 11" (1.803 m)   Wt 93.4 kg (206 lb)   SpO2 95%   BMI 28.73 kg/m²       Post vital signs: stable    Level of consciousness: awake and alert     Nausea/Vomiting: no nausea/no vomiting    Complications: none    Airway Patency: patent    Respiratory: unassisted, spontaneous ventilation, room air    Cardiovascular: stable    Hydration: euvolemic  "

## 2018-07-27 NOTE — ANESTHESIA POSTPROCEDURE EVALUATION
"Anesthesia Post Evaluation    Patient: Gideon Ribera Jr.    Procedure(s) Performed: Procedure(s) (LRB):  ESOPHAGOGASTRODUODENOSCOPY (EGD) (N/A)    Final Anesthesia Type: MAC  Patient location during evaluation: PACU  Patient participation: Yes- Able to Participate  Level of consciousness: awake and alert and oriented  Post-procedure vital signs: reviewed and stable  Pain management: adequate  Airway patency: patent  PONV status at discharge: No PONV  Anesthetic complications: no      Cardiovascular status: blood pressure returned to baseline  Respiratory status: unassisted, room air and spontaneous ventilation  Hydration status: euvolemic  Follow-up not needed.        Visit Vitals  /62 (BP Location: Left arm, Patient Position: Lying)   Pulse 76   Temp 36.8 °C (98.2 °F) (Oral)   Resp 16   Ht 5' 11" (1.803 m)   Wt 93.4 kg (206 lb)   SpO2 95%   BMI 28.73 kg/m²       Pain/Petey Score: Pain Assessment Performed: Yes (7/27/2018 10:11 AM)  Presence of Pain: denies (7/27/2018 10:11 AM)  Petey Score: 10 (7/27/2018 10:11 AM)      "

## 2018-07-27 NOTE — TRANSFER OF CARE
"Anesthesia Transfer of Care Note    Patient: Gideon Ribera Jr.    Procedure(s) Performed: Procedure(s) (LRB):  ESOPHAGOGASTRODUODENOSCOPY (EGD) (N/A)    Patient location: PACU    Anesthesia Type: MAC    Transport from OR: Transported from OR on room air with adequate spontaneous ventilation    Post pain: adequate analgesia    Post assessment: no apparent anesthetic complications    Post vital signs: stable    Level of consciousness: sedated    Nausea/Vomiting: no nausea/vomiting    Complications: none    Transfer of care protocol was followed      Last vitals:   Visit Vitals  /62 (BP Location: Left arm, Patient Position: Lying)   Pulse 76   Temp 36.8 °C (98.2 °F) (Oral)   Resp 16   Ht 5' 11" (1.803 m)   Wt 93.4 kg (206 lb)   SpO2 95%   BMI 28.73 kg/m²     "

## 2018-07-27 NOTE — ANESTHESIA PREPROCEDURE EVALUATION
07/27/2018  Gideon Ribera Jr. is a 43 y.o., male.    Anesthesia Evaluation    I have reviewed the Patient Summary Reports.    I have reviewed the Nursing Notes.   I have reviewed the Medications.     Review of Systems  Anesthesia Hx:  No problems with previous Anesthesia    Social:  Alcohol Use, Former Smoker    Hematology/Oncology:  Hematology Normal   Oncology Normal     EENT/Dental:EENT/Dental Normal   Cardiovascular:   Exercise tolerance: good ECG has been reviewed. Sinus bradycardia  Incomplete right bundle branch block  Borderline Abnormal ECG  When compared with ECG of 14-JUL-2016 08:41,  Previous ECG has undetermined rhythm, needs review  Confirmed by FANTA DAY MD ([id number]) on 6/2/2018 7:30:57 AM   Pulmonary:   Snore   Hepatic/GI:   GERD, well controlled Castano's esophagus without dysplasia  2200 last drink of fluid.  1800 last meal.   Musculoskeletal:  Musculoskeletal Normal    Neurological:   Neuromuscular Disease, Seizures, well controlled    Endocrine:  Endocrine Normal    Dermatological:  Skin Normal    Psych:  Psychiatric Normal           Physical Exam  General:  Well nourished    Airway/Jaw/Neck:  Airway Findings: Mallampati: III                Anesthesia Plan  Type of Anesthesia, risks & benefits discussed:  Anesthesia Type:  MAC  Patient's Preference:   Intra-op Monitoring Plan:   Intra-op Monitoring Plan Comments:   Post Op Pain Control Plan:   Post Op Pain Control Plan Comments:   Induction:   IV  Beta Blocker:  Patient is not currently on a Beta-Blocker (No further documentation required).       Informed Consent: Patient understands risks and agrees with Anesthesia plan.  Questions answered. Anesthesia consent signed with patient.  ASA Score: 2     Day of Surgery Review of History & Physical: I have interviewed and examined the patient. I have reviewed the patient's H&P dated:  07/27/18. There are no significant changes.          Ready For Surgery From Anesthesia Perspective.

## 2018-07-27 NOTE — DISCHARGE INSTRUCTIONS
What Is Castano Esophagus?          You have Castano esophagus. This means that there have been changes to the lining of the esophagus near the stomach. The changes may have been caused by the acid reflux that happens with GERD (gastroesophageal reflux disease). The changed lining is not cancerous, but may increase your chances of developing cancer later on.      When you have GERD  The esophagus is the tube that carries food and liquid from the mouth to the stomach. Your lower esophageal sphincter (LES) is a one-way valve at the top of the stomach. It keeps food and stomach acid from flowing backward. If the LES is weakened, food and stomach acid flow back (reflux) into your esophagus. If this happens often, the condition is called GERD.  Changes in the lining  The stomach is kept safe from its own acid by a special lining. The esophagus isnt meant to contact stomach acid. With GERD, acid flows back into the esophagus often. This damages the esophagus. In response to the damage, new tissue forms that is not normal. This is Castano esophagus. The new tissue may keep changing. This is why it is more likely to become cancer in the future.  Preventing further damage  Your healthcare provider may suggest regular tests to keep track of changes in the esophagus. This usually includes an endoscopy, when a flexible lighted scope is placed through the mouth into the esophagus. Biopsies (tissue samples) can be taken of the abnormal areas. You are usually sedated with an IV medicine for comfort. He or she may also suggest ways for you to control GERD. This includes lifestyle changes, medicine, or even surgery. This should help keep your Castano esophagus from getting worse.  Symptoms of GERD  Symptoms include the following:  · Heartburn  · Sour-tasting fluid backing up into your mouth  · Frequent burping or belching  · Symptoms that get worse after you eat, bend over, or lie down  · Coughing repeatedly to clear your  throat  · Hoarseness   Date Last Reviewed: 6/1/2016  © 3958-7705 [employer]. [address] [place] [address], [place] [address]. All rights reserved. This information is not intended as a substitute for professional medical care. Always follow your healthcare professional's instructions.        Gastritis (Adult)    Gastritis is inflammation and irritation of the stomach lining. It can be present for a short time (acute) or be long lasting (chronic). Gastritis is often caused by infection with bacteria called H pylori. More than a third of people in the  have this bacteria in their bodies. In many cases, H pylori causes no problems or symptoms. In some people, though, the infection irritates the stomach lining and causes gastritis. Other causes of stomach irritation include drinking alcohol or taking pain-relieving medicines called NSAIDs (such as aspirin or ibuprofen).   Symptoms of gastritis can include:  · Abdominal pain or bloating  · Loss of appetite  · Nausea or vomiting  · Vomiting blood or having black stools  · Feeling more tired than usual  An inflamed and irritated stomach lining is more likely to develop a sore called an ulcer. To help prevent this, gastritis should be treated.  Home care  If needed, medicines may be prescribed. If you have H pylori infection, treating it will likely relieve your symptoms. Other changes can help reduce stomach irritation and help it heal.  · If you have been prescribed medicines for H pylori infection, take them as directed. Take all of the medicine until it is finished or your healthcare provider tells you to stop, even if you feel better.  · Your healthcare provider may recommend avoiding NSAIDs. If you take daily aspirin for your heart or other medical reasons, do not stop without talking to your healthcare provider first.  · Avoid drinking alcohol.  · Stop smoking. Smoking can irritate the stomach and delay healing. As much as possible, stay away from second  hand smoke.  Follow-up care  Follow up with your healthcare provider, or as advised by our staff. Testing may be needed to check for inflammation or an ulcer.  When to seek medical advice  Call your healthcare provider for any of the following:  · Stomach pain that gets worse or moves to the lower right abdomen (appendix area)  · Chest pain that appears or gets worse, or spreads to the back, neck, shoulder, or arm  · Frequent vomiting (cant keep down liquids)  · Blood in the stool or vomit (red or black in color)  · Feeling weak or dizzy  · Fever of 100.4ºF (38ºC) or higher, or as directed by your healthcare provider  Date Last Reviewed: 6/22/2015  © 1185-9823 [employer]. [address] [place] [address], [place] [address]. All rights reserved. This information is not intended as a substitute for professional medical care. Always follow your healthcare professional's instructions.

## 2018-07-27 NOTE — [employer] PATIENT INSTRUCTIONS
Discharge Summary/Instructions after an Endoscopic Procedure  Patient Name: Gideon Ribera  Patient MRN: [id number]  Patient YOB: 1975 Friday, July 27, 2018 Gerardo Stockton III, MD  RESTRICTIONS:  During your procedure today, you received medications for sedation.  These   medications may affect your judgment, balance and coordination.  Therefore,   for 24 hours, you have the following restrictions:   - DO NOT drive a car, operate machinery, make legal/financial decisions,   sign important papers or drink alcohol.    ACTIVITY:  Today: no heavy lifting, straining or running due to procedural   sedation/anesthesia.  The following day: return to full activity including work.  DIET:  Eat and drink normally unless instructed otherwise.     TREATMENT FOR COMMON SIDE EFFECTS:  - Mild abdominal pain, nausea, belching, bloating or excessive gas:  rest,   eat lightly and use a heating pad.  - Sore Throat: treat with throat lozenges and/or gargle with warm salt   water.  - Because air was used during the procedure, expelling large amounts of air   from your rectum or belching is normal.  - If a bowel prep was taken, you may not have a bowel movement for 1-3 days.    This is normal.  SYMPTOMS TO WATCH FOR AND REPORT TO YOUR PHYSICIAN:  1. Abdominal pain or bloating, other than gas cramps.  2. Chest pain.  3. Back pain.  4. Signs of infection such as: chills or fever occurring within 24 hours   after the procedure.  5. Rectal bleeding, which would show as bright red, maroon, or black stools.   (A tablespoon of blood from the rectum is not serious, especially if   hemorrhoids are present.)  6. Vomiting.  7. Weakness or dizziness.  GO DIRECTLY TO THE NEAREST EMERGENCY ROOM IF YOU HAVE ANY OF THE FOLLOWING:      Difficulty breathing              Chills and/or fever over 101 F   Persistent vomiting and/or vomiting blood   Severe abdominal pain   Severe chest pain   Black, tarry stools   Bleeding- more than one tablespoon   Any  other symptom or condition that you feel may need urgent attention  Your doctor recommends these additional instructions:  If any biopsies were taken, your doctors clinic will contact you in 1 to 2   weeks with any results.  - Discharge patient to home (via wheelchair).   - Resume previous diet.   - Continue present medications.   - Await pathology results.   - Repeat upper endoscopy in 2 years for surveillance.   - Return to primary care physician as previously scheduled.  For questions, problems or results please call your physician Gerardo Stockton III, MD at Work:  [phone]  If you have any questions about the above instructions, call the GI   department at [phone] or call the endoscopy unit at [phone]   from 7am until 3 pm.  [facility], EMERGENCY ROOM PHONE NUMBER:   [phone]  IF A COMPLICATION OR EMERGENCY SITUATION ARISES AND YOU ARE UNABLE TO REACH   YOUR PHYSICIAN - GO DIRECTLY TO THE EMERGENCY ROOM.  I have read or have had read to me these discharge instructions for my   procedure and have received a written copy.  I understand these   instructions and will follow-up with my physician if I have any questions.     __________________________________       _____________________________________  Nurse Signature                                          Patient/Designated   Responsible Party Signature  Gerardo Stockton III, MD  7/27/2018 10:17:37 AM  This report has been verified and signed electronically.  [employer]

## 2018-07-27 NOTE — DISCHARGE SUMMARY
[facility]  Brief Operative Note     SUMMARY     Surgery Date: 7/27/2018     Surgeon(s) and Role:     * Gerardo Stockton III, MD - Primary    Assisting Surgeon: None    Pre-op Diagnosis:  Castano's esophagus without dysplasia [K22.70]    Post-op Diagnosis:  Post-Op Diagnosis Codes:     * Castano's esophagus without dysplasia [K22.70]    Procedure(s) (LRB):  ESOPHAGOGASTRODUODENOSCOPY (EGD) (N/A)    Anesthesia: Choice    Description of the findings of the procedure: Procedures completed. See Procedure note for full details.    Findings/Key Components: Procedures completed. See Procedure note for full details.    Prosthetics/Devices: None    Estimated Blood Loss: * No values recorded between 7/27/2018 12:00 AM and 7/27/2018 10:18 AM *         Specimens:   Specimen (12h ago through future)    Start     Ordered    07/27/18 1000  Specimen to Pathology - Surgery  Once     Comments:  1.  Antrum biopsy,Gastritis -R/O H Pylori2.  Esophagus biopsy,Castano's surveillance      07/27/18 1005          Discharge Note    SUMMARY     Admit Date: 7/27/2018    Discharge Date and Time: 7/27/2018    Hospital Course (synopsis of major diagnoses, care, treatment, and services provided during the course of the hospital stay):  Procedures completed. See Procedure note for full details. Discharge patient when discharge criteria met.    Final Diagnosis: Post-Op Diagnosis Codes:     * Castano's esophagus without dysplasia [K22.70]    Disposition: Discharge patient when discharge criteria met.    Follow Up/Patient Instructions:       Medications:  Reconciled Home Medications: Current Discharge Medication List      CONTINUE these medications which have NOT CHANGED    Details   pantoprazole (PROTONIX) 20 MG tablet Take 2 tablets (40 mg total) by mouth once daily.  Qty: 30 tablet, Refills: 11    Associated Diagnoses: Esophageal reflux              Discharge Procedure Orders  Diet general     Activity as tolerated

## 2018-08-07 ENCOUNTER — PATIENT MESSAGE (OUTPATIENT)
Dept: GASTROENTEROLOGY | Facility: CLINIC | Age: 43
End: 2018-08-07

## 2019-04-10 ENCOUNTER — OFFICE VISIT (OUTPATIENT)
Dept: URGENT CARE | Facility: CLINIC | Age: 44
End: 2019-04-10
Payer: COMMERCIAL

## 2019-04-10 VITALS
OXYGEN SATURATION: 99 % | TEMPERATURE: 96 F | WEIGHT: 213.88 LBS | SYSTOLIC BLOOD PRESSURE: 124 MMHG | HEIGHT: 71 IN | BODY MASS INDEX: 29.94 KG/M2 | DIASTOLIC BLOOD PRESSURE: 86 MMHG | HEART RATE: 85 BPM

## 2019-04-10 DIAGNOSIS — H65.92 LEFT OTITIS MEDIA WITH EFFUSION: Primary | ICD-10-CM

## 2019-04-10 DIAGNOSIS — H60.502 ACUTE OTITIS EXTERNA OF LEFT EAR, UNSPECIFIED TYPE: ICD-10-CM

## 2019-04-10 PROCEDURE — 99999 PR PBB SHADOW E&M-EST. PATIENT-LVL III: CPT | Mod: PBBFAC,,, | Performed by: NURSE PRACTITIONER

## 2019-04-10 PROCEDURE — 99999 PR PBB SHADOW E&M-EST. PATIENT-LVL III: ICD-10-PCS | Mod: PBBFAC,,, | Performed by: NURSE PRACTITIONER

## 2019-04-10 PROCEDURE — 3008F PR BODY MASS INDEX (BMI) DOCUMENTED: ICD-10-PCS | Mod: CPTII,S$GLB,, | Performed by: NURSE PRACTITIONER

## 2019-04-10 PROCEDURE — 99214 PR OFFICE/OUTPT VISIT, EST, LEVL IV, 30-39 MIN: ICD-10-PCS | Mod: S$GLB,,, | Performed by: NURSE PRACTITIONER

## 2019-04-10 PROCEDURE — 3008F BODY MASS INDEX DOCD: CPT | Mod: CPTII,S$GLB,, | Performed by: NURSE PRACTITIONER

## 2019-04-10 PROCEDURE — 99214 OFFICE O/P EST MOD 30 MIN: CPT | Mod: S$GLB,,, | Performed by: NURSE PRACTITIONER

## 2019-04-10 RX ORDER — CEFDINIR 300 MG/1
300 CAPSULE ORAL 2 TIMES DAILY
Qty: 20 CAPSULE | Refills: 0 | Status: SHIPPED | OUTPATIENT
Start: 2019-04-10 | End: 2019-04-16 | Stop reason: ALTCHOICE

## 2019-04-10 RX ORDER — OFLOXACIN 3 MG/ML
5 SOLUTION AURICULAR (OTIC) DAILY
Qty: 50 DROP | Refills: 0 | Status: SHIPPED | OUTPATIENT
Start: 2019-04-10 | End: 2019-04-20

## 2019-04-10 NOTE — PATIENT INSTRUCTIONS

## 2019-04-10 NOTE — PROGRESS NOTES
Subjective:       Patient ID: Gideon Ribera Jr. is a 44 y.o. male.    Chief Complaint: Lymphadenopathy and Otalgia (Patient complains of left ear pain. )    Pt is a 44 year old male to clinic today with complaints of left otalgia and congestion that began Monday.     Otalgia    There is pain in the left ear. This is a new problem. The current episode started in the past 7 days. The problem occurs constantly. The problem has been unchanged. There has been no fever. The pain is at a severity of 2/10. The pain is mild. Associated symptoms include neck pain. Pertinent negatives include no abdominal pain, coughing, diarrhea, ear discharge, headaches, hearing loss, rash, rhinorrhea, sore throat or vomiting. He has tried nothing for the symptoms.     Review of Systems   Constitutional: Negative for chills, diaphoresis, fatigue and fever.   HENT: Positive for ear pain. Negative for congestion, ear discharge, hearing loss, postnasal drip, rhinorrhea, sinus pressure, sinus pain, sneezing, sore throat and trouble swallowing.    Respiratory: Negative for cough, chest tightness, shortness of breath and wheezing.    Cardiovascular: Negative for chest pain and palpitations.   Gastrointestinal: Negative for abdominal pain, diarrhea, nausea and vomiting.   Musculoskeletal: Positive for neck pain. Negative for back pain and myalgias.   Skin: Negative for rash.   Neurological: Negative for dizziness, light-headedness and headaches.       Objective:      Physical Exam   Constitutional: He is oriented to person, place, and time. He appears well-developed and well-nourished. No distress.   HENT:   Head: Normocephalic.   Right Ear: Tympanic membrane, external ear and ear canal normal. No tenderness. Tympanic membrane is not bulging.   Left Ear: External ear and ear canal normal. No tenderness. Tympanic membrane is injected and erythematous. Tympanic membrane is not bulging. A middle ear effusion is present.   Nose: Nose normal. No  mucosal edema or rhinorrhea. Right sinus exhibits no maxillary sinus tenderness and no frontal sinus tenderness. Left sinus exhibits no maxillary sinus tenderness and no frontal sinus tenderness.   Mouth/Throat: Uvula is midline, oropharynx is clear and moist and mucous membranes are normal. No oropharyngeal exudate, posterior oropharyngeal edema or posterior oropharyngeal erythema.   Eyes: Pupils are equal, round, and reactive to light. Conjunctivae and EOM are normal.   Neck: Normal range of motion. Neck supple.   Cardiovascular: Normal rate, regular rhythm and normal heart sounds. Exam reveals no gallop and no friction rub.   No murmur heard.  Pulmonary/Chest: Effort normal and breath sounds normal. No accessory muscle usage or stridor. No apnea, no tachypnea and no bradypnea. No respiratory distress. He has no decreased breath sounds. He has no wheezes. He has no rhonchi. He has no rales.   Lymphadenopathy:        Head (right side): No submental, no submandibular and no tonsillar adenopathy present.        Head (left side): No submental, no submandibular and no tonsillar adenopathy present.     He has no cervical adenopathy.   Neurological: He is alert and oriented to person, place, and time.   Skin: Skin is warm and dry. No rash noted. He is not diaphoretic.   Psychiatric: He has a normal mood and affect. His speech is normal and behavior is normal. Thought content normal.   Nursing note and vitals reviewed.      Assessment:       1. Left otitis media with effusion    2. Acute otitis externa of left ear, unspecified type        Plan:   Left otitis media with effusion  -     cefdinir (OMNICEF) 300 MG capsule; Take 1 capsule (300 mg total) by mouth 2 (two) times daily. for 10 days  Dispense: 20 capsule; Refill: 0    Acute otitis externa of left ear, unspecified type  -     ofloxacin (FLOXIN) 0.3 % otic solution; Place 5 drops into the left ear once daily. for 10 days  Dispense: 50 drop; Refill: 0      Antibiotic  Therapy  Take antibiotics for entire course.  Do not save medications for later, all medications must be taken for full regimen.    Follow prescribed treatment plan as directed.  Stay hydrated and rest.  Report to ER if symptoms worsen.  Follow up with PCP in 2-3 days or sooner if symptoms do not improve.

## 2019-04-16 ENCOUNTER — OFFICE VISIT (OUTPATIENT)
Dept: URGENT CARE | Facility: CLINIC | Age: 44
End: 2019-04-16
Payer: COMMERCIAL

## 2019-04-16 VITALS
OXYGEN SATURATION: 99 % | HEIGHT: 72 IN | DIASTOLIC BLOOD PRESSURE: 84 MMHG | BODY MASS INDEX: 28.7 KG/M2 | WEIGHT: 211.88 LBS | HEART RATE: 86 BPM | TEMPERATURE: 99 F | SYSTOLIC BLOOD PRESSURE: 120 MMHG

## 2019-04-16 DIAGNOSIS — J01.90 ACUTE BACTERIAL SINUSITIS: Primary | ICD-10-CM

## 2019-04-16 DIAGNOSIS — B96.89 ACUTE BACTERIAL SINUSITIS: Primary | ICD-10-CM

## 2019-04-16 PROCEDURE — 99999 PR PBB SHADOW E&M-EST. PATIENT-LVL IV: CPT | Mod: PBBFAC,,, | Performed by: PHYSICIAN ASSISTANT

## 2019-04-16 PROCEDURE — 99214 PR OFFICE/OUTPT VISIT, EST, LEVL IV, 30-39 MIN: ICD-10-PCS | Mod: S$GLB,,, | Performed by: PHYSICIAN ASSISTANT

## 2019-04-16 PROCEDURE — 3008F PR BODY MASS INDEX (BMI) DOCUMENTED: ICD-10-PCS | Mod: CPTII,S$GLB,, | Performed by: PHYSICIAN ASSISTANT

## 2019-04-16 PROCEDURE — 99999 PR PBB SHADOW E&M-EST. PATIENT-LVL IV: ICD-10-PCS | Mod: PBBFAC,,, | Performed by: PHYSICIAN ASSISTANT

## 2019-04-16 PROCEDURE — 99214 OFFICE O/P EST MOD 30 MIN: CPT | Mod: S$GLB,,, | Performed by: PHYSICIAN ASSISTANT

## 2019-04-16 PROCEDURE — 3008F BODY MASS INDEX DOCD: CPT | Mod: CPTII,S$GLB,, | Performed by: PHYSICIAN ASSISTANT

## 2019-04-16 RX ORDER — DOXYCYCLINE 100 MG/1
100 CAPSULE ORAL EVERY 12 HOURS
Qty: 14 CAPSULE | Refills: 0 | Status: SHIPPED | OUTPATIENT
Start: 2019-04-16 | End: 2019-04-23

## 2019-04-16 RX ORDER — PREDNISONE 20 MG/1
20 TABLET ORAL DAILY
Qty: 5 TABLET | Refills: 0 | Status: SHIPPED | OUTPATIENT
Start: 2019-04-16 | End: 2019-04-21

## 2019-04-16 RX ORDER — FLUTICASONE PROPIONATE 50 MCG
2 SPRAY, SUSPENSION (ML) NASAL DAILY
Qty: 16 G | Refills: 0 | Status: SHIPPED | OUTPATIENT
Start: 2019-04-16 | End: 2019-05-13 | Stop reason: SDUPTHER

## 2019-04-16 NOTE — PROGRESS NOTES
Subjective:      Patient ID: Gideon Ribera Jr. is a 44 y.o. male.    Chief Complaint: Sinus Problem    Sinusitis   This is a new problem. Maximum temperature: running fevers. Associated symptoms include chills, congestion, coughing, sinus pressure, sneezing and a sore throat (from pnd/coughing ). Pertinent negatives include no ear pain, neck pain, shortness of breath or swollen glands. Treatments tried: on Cefdinir for ear infection that has improved      Review of Systems   Constitutional: Positive for chills and fever.   HENT: Positive for congestion, postnasal drip, rhinorrhea, sinus pressure, sneezing and sore throat (from pnd/coughing ). Negative for ear pain.    Eyes: Negative for pain and redness.   Respiratory: Positive for cough. Negative for shortness of breath and wheezing.    Gastrointestinal: Negative for abdominal pain, nausea and vomiting.   Musculoskeletal: Negative for neck pain.   Skin: Negative for rash.       Objective:   /84 (BP Location: Right arm, Patient Position: Sitting, BP Method: Large (Manual))   Pulse 86   Temp 99.4 °F (37.4 °C) (Oral)   Ht 6' (1.829 m)   Wt 96.1 kg (211 lb 13.8 oz)   SpO2 99%   BMI 28.73 kg/m²   Physical Exam   Constitutional: He is oriented to person, place, and time. He appears well-developed and well-nourished. No distress.   HENT:   Head: Normocephalic.   Right Ear: Tympanic membrane, external ear and ear canal normal.   Left Ear: Tympanic membrane, external ear and ear canal normal.   Nose: No mucosal edema or rhinorrhea. Right sinus exhibits maxillary sinus tenderness. Right sinus exhibits no frontal sinus tenderness. Left sinus exhibits maxillary sinus tenderness. Left sinus exhibits no frontal sinus tenderness.   Mouth/Throat: Uvula is midline, oropharynx is clear and moist and mucous membranes are normal. No oropharyngeal exudate, posterior oropharyngeal edema or posterior oropharyngeal erythema (hyperemia ).   Eyes: Conjunctivae and EOM are  normal.   Neck: Normal range of motion. Neck supple.   Cardiovascular: Normal rate, regular rhythm and normal heart sounds.   Pulmonary/Chest: Effort normal and breath sounds normal. No accessory muscle usage. No apnea, no tachypnea and no bradypnea. No respiratory distress. He has no decreased breath sounds. He has no wheezes. He has no rhonchi. He has no rales.   Lymphadenopathy:        Head (right side): No submental, no submandibular and no tonsillar adenopathy present.        Head (left side): No submental, no submandibular and no tonsillar adenopathy present.     He has no cervical adenopathy.   Neurological: He is alert and oriented to person, place, and time.   Skin: Skin is warm and dry. He is not diaphoretic.     Assessment:      1. Acute bacterial sinusitis       Plan:   Acute bacterial sinusitis  -     doxycycline (VIBRAMYCIN) 100 MG Cap; Take 1 capsule (100 mg total) by mouth every 12 (twelve) hours. for 7 days  Dispense: 14 capsule; Refill: 0  -     predniSONE (DELTASONE) 20 MG tablet; Take 1 tablet (20 mg total) by mouth once daily. for 5 days  Dispense: 5 tablet; Refill: 0  -     fluticasone (FLONASE) 50 mcg/actuation nasal spray; 2 sprays (100 mcg total) by Each Nare route once daily.  Dispense: 16 g; Refill: 0    Acute Sinusitis    -  discussed oral steroid to reduce nasal passageway inflammation.  Patient informed of potential side effects of oral steroid including elevating blood pressure, increased blood glucose levels, increased risk of opportunistic infections, peptic ulcer disease and GI bleeding, insomnia, tremors, suppression of ones own steroid production, avascular necrosis, osteoporosis, increased intraocular pressure, etc. Patient verbalizes risk/benefit profile and agrees to oral steroid   -  start doxycycline    -  flonase, nasal saline irrigation, increase fluids     AVS provided and instructions reviewed with patient. Patient was counseled on supportive care and instructed to return  or contact primary care provider if condition does not improve or for any new or worsening symptoms.    Eri Boudreaux PA-C   Physician Assistant   [facility]

## 2019-04-16 NOTE — PATIENT INSTRUCTIONS
Flonase   Nasal Saline Irrigation   Humidifier   Hold off on antihistamines when thick mucous   Take prednisone each morning   Start Doxycycline tonight

## 2019-05-13 DIAGNOSIS — J01.90 ACUTE BACTERIAL SINUSITIS: ICD-10-CM

## 2019-05-13 DIAGNOSIS — B96.89 ACUTE BACTERIAL SINUSITIS: ICD-10-CM

## 2019-05-13 RX ORDER — FLUTICASONE PROPIONATE 50 MCG
2 SPRAY, SUSPENSION (ML) NASAL DAILY
Qty: 16 ML | Refills: 0 | Status: SHIPPED | OUTPATIENT
Start: 2019-05-13 | End: 2019-06-03

## 2019-06-03 ENCOUNTER — OFFICE VISIT (OUTPATIENT)
Dept: OPHTHALMOLOGY | Facility: CLINIC | Age: 44
End: 2019-06-03
Payer: COMMERCIAL

## 2019-06-03 ENCOUNTER — CLINICAL SUPPORT (OUTPATIENT)
Dept: INTERNAL MEDICINE | Facility: CLINIC | Age: 44
End: 2019-06-03
Payer: COMMERCIAL

## 2019-06-03 ENCOUNTER — OFFICE VISIT (OUTPATIENT)
Dept: INTERNAL MEDICINE | Facility: CLINIC | Age: 44
End: 2019-06-03

## 2019-06-03 ENCOUNTER — PATIENT MESSAGE (OUTPATIENT)
Dept: INTERNAL MEDICINE | Facility: CLINIC | Age: 44
End: 2019-06-03

## 2019-06-03 VITALS
HEART RATE: 58 BPM | HEART RATE: 58 BPM | DIASTOLIC BLOOD PRESSURE: 78 MMHG | WEIGHT: 207.25 LBS | WEIGHT: 207.25 LBS | HEIGHT: 72 IN | OXYGEN SATURATION: 100 % | RESPIRATION RATE: 16 BRPM | SYSTOLIC BLOOD PRESSURE: 118 MMHG | SYSTOLIC BLOOD PRESSURE: 118 MMHG | BODY MASS INDEX: 28.07 KG/M2 | TEMPERATURE: 97 F | HEIGHT: 72 IN | RESPIRATION RATE: 16 BRPM | BODY MASS INDEX: 28.07 KG/M2 | DIASTOLIC BLOOD PRESSURE: 78 MMHG

## 2019-06-03 DIAGNOSIS — Z01.00 ENCOUNTER FOR EXAMINATION OF EYES AND VISION WITHOUT ABNORMAL FINDINGS: Primary | ICD-10-CM

## 2019-06-03 DIAGNOSIS — K22.70 BARRETT'S ESOPHAGUS WITHOUT DYSPLASIA: ICD-10-CM

## 2019-06-03 DIAGNOSIS — K21.9 GASTROESOPHAGEAL REFLUX DISEASE WITHOUT ESOPHAGITIS: ICD-10-CM

## 2019-06-03 DIAGNOSIS — Z13.5 GLAUCOMA SCREENING: ICD-10-CM

## 2019-06-03 DIAGNOSIS — Z00.00 ROUTINE GENERAL MEDICAL EXAMINATION AT A HEALTH CARE FACILITY: Primary | ICD-10-CM

## 2019-06-03 DIAGNOSIS — Z00.00 ROUTINE HEALTH MAINTENANCE: Primary | ICD-10-CM

## 2019-06-03 DIAGNOSIS — H52.4 PRESBYOPIA: ICD-10-CM

## 2019-06-03 LAB
ALBUMIN SERPL BCP-MCNC: 4.3 G/DL (ref 3.5–5.2)
ALP SERPL-CCNC: 73 U/L (ref 55–135)
ALT SERPL W/O P-5'-P-CCNC: 23 U/L (ref 10–44)
ANION GAP SERPL CALC-SCNC: 8 MMOL/L (ref 8–16)
AST SERPL-CCNC: 27 U/L (ref 10–40)
BILIRUB SERPL-MCNC: 0.6 MG/DL (ref 0.1–1)
BUN SERPL-MCNC: 18 MG/DL (ref 6–20)
CALCIUM SERPL-MCNC: 9.8 MG/DL (ref 8.7–10.5)
CHLORIDE SERPL-SCNC: 104 MMOL/L (ref 95–110)
CHOLEST SERPL-MCNC: 161 MG/DL (ref 120–199)
CHOLEST/HDLC SERPL: 4.5 {RATIO} (ref 2–5)
CO2 SERPL-SCNC: 28 MMOL/L (ref 23–29)
COMPLEXED PSA SERPL-MCNC: 0.84 NG/ML (ref 0–4)
CREAT SERPL-MCNC: 1.2 MG/DL (ref 0.5–1.4)
ERYTHROCYTE [DISTWIDTH] IN BLOOD BY AUTOMATED COUNT: 12.4 % (ref 11.5–14.5)
EST. GFR  (AFRICAN AMERICAN): >60 ML/MIN/1.73 M^2
EST. GFR  (NON AFRICAN AMERICAN): >60 ML/MIN/1.73 M^2
ESTIMATED AVG GLUCOSE: 103 MG/DL (ref 68–131)
GLUCOSE SERPL-MCNC: 100 MG/DL (ref 70–110)
HBA1C MFR BLD HPLC: 5.2 % (ref 4–5.6)
HCT VFR BLD AUTO: 42.7 % (ref 40–54)
HDLC SERPL-MCNC: 36 MG/DL (ref 40–75)
HDLC SERPL: 22.4 % (ref 20–50)
HGB BLD-MCNC: 14 G/DL (ref 14–18)
LDLC SERPL CALC-MCNC: 103.6 MG/DL (ref 63–159)
MCH RBC QN AUTO: 28.8 PG (ref 27–31)
MCHC RBC AUTO-ENTMCNC: 32.8 G/DL (ref 32–36)
MCV RBC AUTO: 88 FL (ref 82–98)
NONHDLC SERPL-MCNC: 125 MG/DL
PLATELET # BLD AUTO: 220 K/UL (ref 150–350)
PMV BLD AUTO: 10.4 FL (ref 9.2–12.9)
POTASSIUM SERPL-SCNC: 4.1 MMOL/L (ref 3.5–5.1)
PROT SERPL-MCNC: 7.2 G/DL (ref 6–8.4)
RBC # BLD AUTO: 4.86 M/UL (ref 4.6–6.2)
SODIUM SERPL-SCNC: 140 MMOL/L (ref 136–145)
TRIGL SERPL-MCNC: 107 MG/DL (ref 30–150)
TSH SERPL DL<=0.005 MIU/L-ACNC: 0.71 UIU/ML (ref 0.4–4)
WBC # BLD AUTO: 4.96 K/UL (ref 3.9–12.7)

## 2019-06-03 PROCEDURE — 99999 PR PBB SHADOW E&M-EST. PATIENT-LVL II: CPT | Mod: PBBFAC,,, | Performed by: OPTOMETRIST

## 2019-06-03 PROCEDURE — 92015 DETERMINE REFRACTIVE STATE: CPT | Mod: S$GLB,,, | Performed by: OPTOMETRIST

## 2019-06-03 PROCEDURE — 99999 PR PBB SHADOW E&M-EST. PATIENT-LVL II: ICD-10-PCS | Mod: PBBFAC,,, | Performed by: OPTOMETRIST

## 2019-06-03 PROCEDURE — 84153 ASSAY OF PSA TOTAL: CPT

## 2019-06-03 PROCEDURE — 99999 PR PBB SHADOW E&M-EST. PATIENT-LVL III: ICD-10-PCS | Mod: PBBFAC,,, | Performed by: FAMILY MEDICINE

## 2019-06-03 PROCEDURE — 84443 ASSAY THYROID STIM HORMONE: CPT

## 2019-06-03 PROCEDURE — 83036 HEMOGLOBIN GLYCOSYLATED A1C: CPT

## 2019-06-03 PROCEDURE — 80053 COMPREHEN METABOLIC PANEL: CPT

## 2019-06-03 PROCEDURE — 92004 COMPRE OPH EXAM NEW PT 1/>: CPT | Mod: S$GLB,,, | Performed by: OPTOMETRIST

## 2019-06-03 PROCEDURE — 92004 PR EYE EXAM, NEW PATIENT,COMPREHESV: ICD-10-PCS | Mod: S$GLB,,, | Performed by: OPTOMETRIST

## 2019-06-03 PROCEDURE — 80061 LIPID PANEL: CPT

## 2019-06-03 PROCEDURE — 99386 PREV VISIT NEW AGE 40-64: CPT | Mod: S$GLB,,, | Performed by: FAMILY MEDICINE

## 2019-06-03 PROCEDURE — 85027 COMPLETE CBC AUTOMATED: CPT

## 2019-06-03 PROCEDURE — 92015 PR REFRACTION: ICD-10-PCS | Mod: S$GLB,,, | Performed by: OPTOMETRIST

## 2019-06-03 PROCEDURE — 99386 PR PREVENTIVE VISIT,NEW,40-64: ICD-10-PCS | Mod: S$GLB,,, | Performed by: FAMILY MEDICINE

## 2019-06-03 PROCEDURE — 99999 PR PBB SHADOW E&M-EST. PATIENT-LVL III: CPT | Mod: PBBFAC,,, | Performed by: FAMILY MEDICINE

## 2019-06-03 NOTE — PROGRESS NOTES
HPI     NP Annual Exam.  Having a [employer] wellness/exucutive health exam.  Per pt no complaints just wanted to get established with [facility]   Never seen by [facility] new to clinic  Wearing otc +1.00 prn  Negative DM, HTN  No previous prescription glasses.  Uses over the counter readers rarely.    Last edited by Mary Zapata, OD on 6/3/2019 10:33 AM. (History)            Assessment /Plan     For exam results, see Encounter Report.    Encounter for examination of eyes and vision without abnormal findings    Glaucoma screening    Presbyopia      Glaucoma screening and fundus exam normal.  Continue over the counter readers as needed..  Return to clinic 1 yr.

## 2019-06-03 NOTE — LETTER
Shana 3, 2019      Kevin Melton MD  [address]  [place] [address]           [facility]  [address]  [place] [address]  Phone: [phone]  Fax: [phone]          Patient: Gideon Ribera Jr.   MR Number: [id number]   YOB: 1975   Date of Visit: 6/3/2019       Dear Dr. Kevin Melton:    Thank you for referring Gideon Ribera to me for evaluation. Attached you will find relevant portions of my assessment and plan of care.    If you have questions, please do not hesitate to call me. I look forward to following Gideon Ribera along with you.    Sincerely,    Mary Zapata, OD    Enclosure  CC:  No Recipients    If you would like to receive this communication electronically, please contact [email or web address] or [phone] to request more information on [employer] access.    For providers and/or their staff who would like to refer a patient to [facility], please contact us through our one-stop-shop provider referral line, [facility], at [phone].    If you feel you have received this communication in error or would no longer like to receive these types of communications, please e-mail [email or web address]

## 2019-06-03 NOTE — Clinical Note
Dr Fuller has hx Barretts. Last egd you did last summer no dysplasia. He wanted to go off ppi and asympt since. Should he get back on?Josesito

## 2019-06-03 NOTE — PROGRESS NOTES
Subjective:       Patient ID: Gideon Ribera Jr. is a . male.    Chief Complaint: exec health phys    HPIhere for exec health phys. No new issues. Now exercising  egd last yr nabil due 2020  gerd asympt. Off protonix. X rare prn    Past Medical History:   Diagnosis Date    Castano esophagus     last egd - q 2yr egd    GERD (gastroesophageal reflux disease)      Past Surgical History:   Procedure Laterality Date    egd  2014     Family History   Problem Relation Age of Onset    Crohn's disease Mother     Cancer Father      mantle cell lymphoma at 60    Diabetes Maternal Grandmother     Diabetes Paternal Grandmother     Diabetes Paternal Grandfather     Prostate cancer Paternal Grandfather      Social History     Social History    Marital status:      Spouse name: N/A    Number of children: 1    Years of education: N/A     Occupational History    [employer]          Social History Main Topics    Smoking status: Never Smoker    Smokeless tobacco: Never Used    Alcohol use Yes      Comment: occas etoh use    Drug use: No    Sexual activity: Yes     Partners: Female     Other Topics Concern    None     Social History Narrative     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 He appears well-developed and well-nourished.   HENT:   Head: Normocephalic and atraumatic.   Right Ear: External ear normal.   Left Ear: External ear normal.   Nose: Nose normal.   Mouth/Throat: Oropharynx is clear and moist.   Nl tms   Eyes: Conjunctivae and EOM are normal. Pupils are equal, round, and reactive to light. No scleral icterus.   Neck: Normal range of motion. Neck supple. Carotid bruit is not present.   Cardiovascular: Normal rate, regular rhythm  and normal heart sounds.  Exam reveals no gallop and no friction rub.    No murmur heard.  Pulmonary/Chest: Effort normal and breath sounds normal. He has no wheezes.   Abdominal: Soft. Bowel sounds are normal. He exhibits no distension and no mass. There is no hepatosplenomegaly. There is no tenderness. There is no rebound and no guarding.   Musculoskeletal: Normal range of motion. He exhibits no tenderness.   Lymphadenopathy:     He has no cervical adenopathy.   Neurological: He is alert and oriented to person, place, and time. No cranial nerve deficit. Coordination normal.   Skin: Skin is warm and dry. No rash noted. No erythema.   Psychiatric: He has a normal mood and affect  ct. His behavior is normal. Judgment and thought content normal.   Nursing note and vitals reviewed.      Assessment:       1. Routine health maintenance      hx barretts  Low hdl  Plan:       **d/wd egd due 7/20  F/u annually  exerc /wt loss discussed  Letter after rsults*  \  Cont exerc inc to daily ; will help w hdl    msg to dr sibley about resming ppi even if asympt to help prevent barretts;ADD: dr sibley said he needs to continue daily indefinitely; I will convey topatient

## 2020-06-08 DIAGNOSIS — Z00.00 ROUTINE GENERAL MEDICAL EXAMINATION AT A HEALTH CARE FACILITY: Primary | ICD-10-CM

## 2020-06-26 ENCOUNTER — OFFICE VISIT (OUTPATIENT)
Dept: OPHTHALMOLOGY | Facility: CLINIC | Age: 45
End: 2020-06-26
Payer: COMMERCIAL

## 2020-06-26 ENCOUNTER — OFFICE VISIT (OUTPATIENT)
Dept: INTERNAL MEDICINE | Facility: CLINIC | Age: 45
End: 2020-06-26

## 2020-06-26 ENCOUNTER — CLINICAL SUPPORT (OUTPATIENT)
Dept: INTERNAL MEDICINE | Facility: CLINIC | Age: 45
End: 2020-06-26
Payer: COMMERCIAL

## 2020-06-26 ENCOUNTER — HOSPITAL ENCOUNTER (OUTPATIENT)
Dept: CARDIOLOGY | Facility: HOSPITAL | Age: 45
Discharge: HOME | End: 2020-06-26
Attending: FAMILY MEDICINE

## 2020-06-26 VITALS
SYSTOLIC BLOOD PRESSURE: 131 MMHG | BODY MASS INDEX: 28.39 KG/M2 | OXYGEN SATURATION: 100 % | DIASTOLIC BLOOD PRESSURE: 83 MMHG | HEART RATE: 62 BPM | HEIGHT: 71 IN | WEIGHT: 202.81 LBS | TEMPERATURE: 96 F

## 2020-06-26 DIAGNOSIS — R00.1 SINUS BRADYCARDIA: Chronic | ICD-10-CM

## 2020-06-26 DIAGNOSIS — I45.10 RIGHT BUNDLE BRANCH BLOCK: Chronic | ICD-10-CM

## 2020-06-26 DIAGNOSIS — H52.03 HYPEROPIA WITH PRESBYOPIA, BILATERAL: ICD-10-CM

## 2020-06-26 DIAGNOSIS — K22.70 BARRETT'S ESOPHAGUS WITHOUT DYSPLASIA: ICD-10-CM

## 2020-06-26 DIAGNOSIS — Z00.00 ROUTINE GENERAL MEDICAL EXAMINATION AT A HEALTH CARE FACILITY: ICD-10-CM

## 2020-06-26 DIAGNOSIS — H52.4 HYPEROPIA WITH PRESBYOPIA, BILATERAL: ICD-10-CM

## 2020-06-26 DIAGNOSIS — H53.8 BLURRED VISION, BILATERAL: Primary | ICD-10-CM

## 2020-06-26 DIAGNOSIS — Z00.00 ROUTINE GENERAL MEDICAL EXAMINATION AT A HEALTH CARE FACILITY: Primary | ICD-10-CM

## 2020-06-26 LAB
ALBUMIN SERPL BCP-MCNC: 4.4 G/DL (ref 3.5–5.2)
ALP SERPL-CCNC: 71 U/L (ref 55–135)
ALT SERPL W/O P-5'-P-CCNC: 18 U/L (ref 10–44)
ANION GAP SERPL CALC-SCNC: 7 MMOL/L (ref 8–16)
AST SERPL-CCNC: 17 U/L (ref 10–40)
BILIRUB SERPL-MCNC: 0.7 MG/DL (ref 0.1–1)
BUN SERPL-MCNC: 15 MG/DL (ref 6–20)
CALCIUM SERPL-MCNC: 9.5 MG/DL (ref 8.7–10.5)
CHLORIDE SERPL-SCNC: 104 MMOL/L (ref 95–110)
CHOLEST SERPL-MCNC: 186 MG/DL (ref 120–199)
CHOLEST/HDLC SERPL: 5.2 {RATIO} (ref 2–5)
CO2 SERPL-SCNC: 29 MMOL/L (ref 23–29)
COMPLEXED PSA SERPL-MCNC: 1 NG/ML (ref 0–4)
CREAT SERPL-MCNC: 1.2 MG/DL (ref 0.5–1.4)
ERYTHROCYTE [DISTWIDTH] IN BLOOD BY AUTOMATED COUNT: 12.1 % (ref 11.5–14.5)
EST. GFR  (AFRICAN AMERICAN): >60 ML/MIN/1.73 M^2
EST. GFR  (NON AFRICAN AMERICAN): >60 ML/MIN/1.73 M^2
ESTIMATED AVG GLUCOSE: 105 MG/DL (ref 68–131)
GLUCOSE SERPL-MCNC: 102 MG/DL (ref 70–110)
HBA1C MFR BLD HPLC: 5.3 % (ref 4–5.6)
HCT VFR BLD AUTO: 42 % (ref 40–54)
HDLC SERPL-MCNC: 36 MG/DL (ref 40–75)
HDLC SERPL: 19.4 % (ref 20–50)
HGB BLD-MCNC: 14.3 G/DL (ref 14–18)
LDLC SERPL CALC-MCNC: 111.4 MG/DL (ref 63–159)
MCH RBC QN AUTO: 29.4 PG (ref 27–31)
MCHC RBC AUTO-ENTMCNC: 34 G/DL (ref 32–36)
MCV RBC AUTO: 86 FL (ref 82–98)
NONHDLC SERPL-MCNC: 150 MG/DL
PLATELET # BLD AUTO: 196 K/UL (ref 150–350)
PMV BLD AUTO: 10.2 FL (ref 9.2–12.9)
POTASSIUM SERPL-SCNC: 4.3 MMOL/L (ref 3.5–5.1)
PROT SERPL-MCNC: 7.4 G/DL (ref 6–8.4)
RBC # BLD AUTO: 4.86 M/UL (ref 4.6–6.2)
SODIUM SERPL-SCNC: 140 MMOL/L (ref 136–145)
TRIGL SERPL-MCNC: 193 MG/DL (ref 30–150)
TSH SERPL DL<=0.005 MIU/L-ACNC: 0.91 UIU/ML (ref 0.4–4)
WBC # BLD AUTO: 5.66 K/UL (ref 3.9–12.7)

## 2020-06-26 PROCEDURE — 85027 COMPLETE CBC AUTOMATED: CPT

## 2020-06-26 PROCEDURE — 80061 LIPID PANEL: CPT

## 2020-06-26 PROCEDURE — 93005 ELECTROCARDIOGRAM TRACING: CPT

## 2020-06-26 PROCEDURE — 99386 PR PREVENTIVE VISIT,NEW,40-64: ICD-10-PCS | Mod: S$GLB,,, | Performed by: FAMILY MEDICINE

## 2020-06-26 PROCEDURE — 92015 PR REFRACTION: ICD-10-PCS | Mod: S$GLB,,, | Performed by: OPTOMETRIST

## 2020-06-26 PROCEDURE — 99999 PR PBB SHADOW E&M-EST. PATIENT-LVL III: CPT | Mod: PBBFAC,,, | Performed by: FAMILY MEDICINE

## 2020-06-26 PROCEDURE — 83036 HEMOGLOBIN GLYCOSYLATED A1C: CPT

## 2020-06-26 PROCEDURE — 80053 COMPREHEN METABOLIC PANEL: CPT

## 2020-06-26 PROCEDURE — 93010 EKG 12-LEAD: ICD-10-PCS | Mod: ,,, | Performed by: INTERNAL MEDICINE

## 2020-06-26 PROCEDURE — 93010 ELECTROCARDIOGRAM REPORT: CPT | Mod: ,,, | Performed by: INTERNAL MEDICINE

## 2020-06-26 PROCEDURE — 92014 PR EYE EXAM, EST PATIENT,COMPREHESV: ICD-10-PCS | Mod: S$GLB,,, | Performed by: OPTOMETRIST

## 2020-06-26 PROCEDURE — 84443 ASSAY THYROID STIM HORMONE: CPT

## 2020-06-26 PROCEDURE — 99999 PR PBB SHADOW E&M-EST. PATIENT-LVL II: ICD-10-PCS | Mod: PBBFAC,,, | Performed by: OPTOMETRIST

## 2020-06-26 PROCEDURE — 99386 PREV VISIT NEW AGE 40-64: CPT | Mod: S$GLB,,, | Performed by: FAMILY MEDICINE

## 2020-06-26 PROCEDURE — 99999 PR PBB SHADOW E&M-EST. PATIENT-LVL III: ICD-10-PCS | Mod: PBBFAC,,, | Performed by: FAMILY MEDICINE

## 2020-06-26 PROCEDURE — 84153 ASSAY OF PSA TOTAL: CPT

## 2020-06-26 PROCEDURE — 92014 COMPRE OPH EXAM EST PT 1/>: CPT | Mod: S$GLB,,, | Performed by: OPTOMETRIST

## 2020-06-26 PROCEDURE — 99999 PR PBB SHADOW E&M-EST. PATIENT-LVL II: CPT | Mod: PBBFAC,,, | Performed by: OPTOMETRIST

## 2020-06-26 PROCEDURE — 92015 DETERMINE REFRACTIVE STATE: CPT | Mod: S$GLB,,, | Performed by: OPTOMETRIST

## 2020-06-26 NOTE — PROGRESS NOTES
HPI     Eye Exam     Comments: Yearly               Comments     NP to DNL  [facility] [place]3/19 for yearly eye exam  Patient here today for yearly eye exam  HPI    Any vision changes since last exam: Yes at near   Eye pain: No  Other ocular symptoms: No    Do you wear currently wear glasses or contacts? OTC readers +1.00    Interested in contacts today? No    Do you plan on getting new glasses today? If needed                Last edited by Mervat Hudson, PCT on 6/26/2020 10:01 AM. (History)              Assessment /Plan     For exam results, see Encounter Report.    Blurred vision, bilateral    Hyperopia with presbyopia, bilateral    Spec Rx is optional, ok to continue with OTC readers    RTC 1 yr for undilated eye exam or PRN if any problems.   Discussed above and answered questions.

## 2020-06-26 NOTE — LETTER
"Dear Mr. Ribera,    Thank you for allowing me to care for you and perform your Executive Health exam on 06/26/2020. It was a pleasure meeting you.    This letter will serve a summary of the medical history you provided, your physical exam findings, and your test results.    If any of your medical history issues, test result findings, or physical exam findings require further evaluation, I've highlighted them in bold in my report that follows.    Summary of issues and findings that require further evaluation: You are due for EGD to monitor your Castano's Esophagitis.     In my report I have listed "Health Maintenance Interventions" that are considered appropriate for a 45 y.o. male. If you have any that are listed as "Due or Due Soon," please see your PCP (primary care provider) to address these.*    Again, it was a pleasure meeting you. I look forward to the next time I can serve you. Until then, take care and be well.    Warmest regards,     CASTRO Horton MD  *Depending on whether or not you have received your routine healthcare at [facility], our record of which Health Maintenance Interventions you have completed may not be up to date. If you have already completed a Health Maintenance Intervention and we have it listed as "Due or Due Soon", please provide us the updated information at your next Executive Health exam.             EXECUTIVE HEALTH ENCOUNTER    REASON FOR VISIT  EXECUTIVE HEALTH    HEALTH MAINTENANCE INTERVENTIONS - UP TO DATE  Health Maintenance Topics with due status: Not Due       Topic Last Completion Date    TETANUS VACCINE 07/14/2016    Lipid Panel 06/26/2020    Influenza Vaccine Not Due       HEALTH MAINTENANCE INTERVENTIONS - DUE OR DUE SOON  There are no preventive care reminders to display for this patient.    PCP  Gideon identifies his PCP as Josesito Dunn MD.    ISSUES  The following issues were identified and addressed.  1. Castano's esophagus without dysplasia    2. Right bundle " branch block    3. Sinus bradycardia        Problem List Items Addressed This Visit     Right bundle branch block (Chronic)    Current Assessment & Plan     Asymptomatic. Clinically stable.         Sinus bradycardia (Chronic)    Current Assessment & Plan     Asymptomatic. Clinically stable.         Castano's esophagus    Overview     EGD 07/27/2018 (Biopsy NEGATIVE for dysplasia.)  Findings:  The esophagus and gastroesophageal junction were examined with white light and narrow band imaging (NBI) from a forward view and retroflexed position. There were esophageal mucosal changes secondary to established short-segment Castano's disease. These changes involved the mucosa along an irregular Z-line (39 cm from the incisors). Four tongues of salmon-colored mucosa were present from 39 to 40 cm and hiatal narrowing was identified at 45 cm. The maximum longitudinal extent of these esophageal mucosal changes was 1 cm in length. Mucosa was biopsied with a cold forceps for histology in a targeted manner 0.5 cm proximal to the GE junction. One specimen bottle was sent to pathology. Estimated blood loss was minimal.    Patchy mild inflammation characterized by congestion (edema) and erythema was found in the gastric antrum and in the prepyloric region of the stomach. Biopsies were taken with a cold forceps for histology. Biopsies were taken with a cold forceps for Helicobacter pylori testing. Estimated blood loss was minimal.   No other significant abnormalities were identified in a careful examination of the stomach.   The duodenal bulb and second portion of the duodenum were normal.     Impression:   - Esophageal mucosal changes secondary to established short-segment Castano's disease. Biopsied.    - Gastritis. Biopsied.    - Normal duodenal bulb and second portion of the duodenum.     Recommendation: Repeat upper endoscopy in 2 years for surveillance.          Relevant Orders    Case request GI: ESOPHAGOGASTRODUODENOSCOPY  "(EGD) (Completed)          DATA SUMMARY    LABORATORY:    CBC: The complete blood count (CBC) checks for anemia and measures the red blood cells, white blood cells, and platelets in your blood.  o YOUR RESULTS: NORMAL. No further evaluation or treatment required.     CMP: The comprehensive metabolic panel (CMP) checks kidney function, liver function, sodium, potassium, glucose (sugar) and other aspects of your metabolism.  o YOUR RESULTS: NORMAL or at least ACCEPTABLE. No further evaluation or treatment required at this time.     Lipid Panel: The lipid panel measures the levels of different types of fatty substances in your blood cholesterol and triglycerides) that can contribute to plaque buildup in your arteries (atherosclerosis).  o YOUR RESULTS: NORMAL or at least ACCEPTABLE. No further evaluation or treatment required at this time.     TSH: The thyroid is a gland in the neck that helps regulate metabolism. The thyroid stimulating hormone (TSH) is a measure of your thyroid activity.  o YOUR RESULTS: NORMAL. No further evaluation or treatment required.     A1c: The hemoglobin A1c (A1c) is a test that evaluates and screens for diabetes.  o YOUR RESULTS: NORMAL. No further evaluation or treatment required.     PSA: Prostate specific antigen (PSA) is a test used to screen for and monitor prostate cancer.  o YOUR RESULTS: NORMAL. No further evaluation or treatment required.    CARDIOPULMONARY:   Electrocardiogram: The electrocardiogram (also referred to as ECG or EKG) is a non-invasive test that measures the electrical activity of the heart's conduction system.  o YOUR RESULTS: NORMAL or at least ACCEPTABLE. No further evaluation or treatment required at this time. (EKG unchanged from 01 JUN 2018.)    PHYSICAL EXAM  His body mass index is 28.29 kg/m². His  height is 5' 11" (1.803 m) and weight is 92 kg (202 lb 13.2 oz). His tympanic temperature is 96.1 °F (35.6 °C). His blood pressure is 131/83 and his pulse " is 62. His oxygen saturation is 100%.    Physical Exam  Constitutional:       General: He is not in acute distress.     Appearance: Normal appearance. He is well-developed. He is not ill-appearing.      Comments: Appears adequately hydrated.   HENT:      Head: Normocephalic and atraumatic.      Right Ear: Tympanic membrane, ear canal and external ear normal.      Left Ear: Tympanic membrane, ear canal and external ear normal.      Ears:      Comments: Hearing grossly intact.  Eyes:      General: No scleral icterus.     Conjunctiva/sclera: Conjunctivae normal.   Neck:      Musculoskeletal: Neck supple. No muscular tenderness.      Vascular: No carotid bruit.      Trachea: No tracheal tenderness or tracheal deviation.   Cardiovascular:      Rate and Rhythm: Normal rate and regular rhythm.      Heart sounds: Normal heart sounds. No murmur. No friction rub. No gallop.       Comments: Mild bradycardia  Pulmonary:      Effort: Pulmonary effort is normal. No respiratory distress.      Breath sounds: Normal breath sounds. No wheezing or rales.   Abdominal:      General: Bowel sounds are normal. There is no distension.      Palpations: Abdomen is soft. There is no mass.      Tenderness: There is no abdominal tenderness.   Musculoskeletal:         General: No tenderness.   Lymphadenopathy:      Cervical: No cervical adenopathy.   Skin:     General: Skin is warm and dry.   Neurological:      General: No focal deficit present.      Mental Status: He is alert and oriented to person, place, and time.      Cranial Nerves: No cranial nerve deficit.      Motor: No weakness.      Gait: Gait normal.   Psychiatric:         Mood and Affect: Mood normal.         Behavior: Behavior normal.         Thought Content: Thought content normal.         Judgment: Judgment normal.       MEDICATIONS  He has a current medication list which includes the following prescription(s): pantoprazole.  There are no discontinued medications.       REVIEW OF  "SYSTEMS  Review of Systems   Constitutional: Negative for chills and fever.   HENT: Negative for hearing loss and sore throat.    Eyes: Negative for pain and redness.   Respiratory: Negative for cough and shortness of breath.    Cardiovascular: Negative for chest pain and claudication.   Gastrointestinal: Negative for blood in stool, change in bowel habit and change in bowel habit.   Endocrine: Negative for polydipsia and polyphagia.   Genitourinary: Negative for dysuria and hematuria.   Musculoskeletal: Negative for gait problem and joint swelling.   Integumentary:  Negative for rash and wound.   Allergic/Immunologic: Negative for frequent infections.   Neurological: Negative for vertigo and seizures.   Hematological: Negative for adenopathy. Does not bruise/bleed easily.   Psychiatric/Behavioral: Negative for confusion and hallucinations.       FOLLOW-UP  He is to follow-up with his PCP for any "Health Maintenance Interventions - Due Or Due Soon" listed above and for any unresolved issues addressed during this encounter. If he has any further medical concerns, I would be happy for him to return to see me for proper evaluation and treatment.    PAST MEDICAL HISTORY  He has a past medical history of Castano esophagus, GERD (gastroesophageal reflux disease), Right bundle branch block, and Sinus bradycardia.    SURGICAL HISTORY  He has a past surgical history that includes egd (2014) and Esophagogastroduodenoscopy (N/A, 7/27/2018).    FAMILY HISTORY  His family history includes Cancer in his father; Crohn's disease in his mother; Diabetes in his maternal grandmother, paternal grandfather, and paternal grandmother; Prostate cancer in his paternal grandfather.     ALLERGIES  He has No Known Allergies.    SOCIAL HISTORY   TOBACCO USE HISTORY: He reports that he has never smoked. He has never used smokeless tobacco.   ALCOHOL USE HISTORY:  He reports current alcohol use.    Documentation entered by me for this encounter " "may have been done in part using speech-recognition technology. Although I have made an effort to ensure accuracy, "sound like" errors may exist and should be interpreted in context. -CASTRO Horton MD.     "

## 2020-06-26 NOTE — PATIENT INSTRUCTIONS
You should be getting a call soon to schedule a date for your EGD. If you haven't received the call within the next few days, please call [facility]'s endoscopy lab at [phone], and someone there will help you schedule your EGD.

## 2020-06-28 NOTE — PROGRESS NOTES
"Gideon Willis.    I'm happy to report that these test results are fine and do not require a change in treatment.    Thanks for letting me care for you, thanks for trusting us with your healthcare needs, and thanks for using [facility].    Sincerely,    CASTRO Horton MD  --------------------------------------------------------------------------------   Want to learn more about your test results and what they mean? (1) Log in to your [facility] account at [email or web address]. (2) From the "View test results" tab, click on the test you want to know more about. (3) Click on the "About This Test" link."

## 2020-06-29 ENCOUNTER — TELEPHONE (OUTPATIENT)
Dept: ENDOSCOPY | Facility: HOSPITAL | Age: 45
End: 2020-06-29

## 2020-07-10 ENCOUNTER — TELEPHONE (OUTPATIENT)
Dept: ENDOSCOPY | Facility: HOSPITAL | Age: 45
End: 2020-07-10

## 2020-07-10 DIAGNOSIS — K22.70 BARRETT ESOPHAGUS: ICD-10-CM

## 2020-07-10 DIAGNOSIS — K22.70 BARRETT'S ESOPHAGUS WITHOUT DYSPLASIA: Primary | ICD-10-CM

## 2020-07-10 NOTE — TELEPHONE ENCOUNTER
COVID Screening     1. Have you had a fever in the last 7 days or have you used fever reducing medicines for a fever in the last 7 days?  no    2. Are you experiencing shortness of breath, cough, muscle aches, loss of taste or loss of smell?  no    3. Are you residing with anyone who has tested positive for Covid?  no    If answered yes to any of the above questions, the pt must be scheduled for an appointment with their PCP.    A message also needs to be sent to the endoscopist to ensure the patient gets rescheduled at a later date.     ENDO screening    1. Have you been admitted overnight to the hospital in the past 3 months? no   If yes, schedule an appointment with PCP before scheduling endoscopic procedure.     2. Have you had a stent placed in the last 12 months? no   If yes, for a screening visit, cancel and message the ordering provider.  The patient will need a new order when the time is appropriate.     3. Have you had a stroke or heart attack in the past 6 months? no   If yes, cancel and refer patient to ordering provider for clearance, also message ordering provider to inform.     4. Have you had any chest pain in the past 3 months? no   If yes, Have you been evaluated by your PCP and/or cardiologist and it was determined to not be heart related? not applicable   If No, Pt needs to be seen by PCP or Cardiologist .  Pt can be scheduled once clearance obtained by either of those providers.     5. Do you take prescription weight loss medications?  no   If yes, must stop for 2 weeks prior to procedure.     6. Have you been diagnosed with diverticulitis within the past 3 months? no   If yes, must have been seen by GI within the last 3 months, if not schedule with GI JEFF.    If pt has been seen by GI, schedule procedure 8-12 weeks post antibiotic treatment.     7. Are you on Dialysis? no  If yes, schedule procedure for the day AFTER dialysis.  Appt time should be 9am or later, patient arrival time is 2 hours  "prior.  Nulytely or    miralax prep for all patients with kidney disease.     8. Are you diabetic?  no   If yes, schedule morning appt. Advise pt to hold all diabetic meds day of procedure.     9. If pt is older than 80 years of age and HAS NOT been seen by GI or PCP within the last 6 months, needs appt with GI JEFF.   If pt has been seen by the GI provider or PCP within the past 6  months AND meets criteria, schedule procedure AND send message to the endoscopist.     10. Is patient on a "high risk" medication (blood thinner/antiplatelet agent)?  no   If yes, has cardiac clearance been obtained within the last 60 days? N/A   If no, a new clearance needs to be obtained.       I have reviewed the last colonoscopy for recommendations regarding next procedure bowel prep. no    I have reviewed medications and allergies.  yes  I have verified the pharmacy information and appropriate prep sent if needed. yes  Prep instructions have been mailed or sent to portal per patient request. yes    If answers yes to any of the following, schedule at [facility] ONLY. If No, OK for either location.     Is BMI over 45?   Any complaints of chest pain, new onset or at rest?  Does pt have an AICD?  Is there a diagnosis of heart failure?  Does patient have an insulin pump?  If procedure for esophageal banding?  "

## 2020-08-04 ENCOUNTER — LAB VISIT (OUTPATIENT)
Dept: OTOLARYNGOLOGY | Facility: CLINIC | Age: 45
End: 2020-08-04
Payer: COMMERCIAL

## 2020-08-04 DIAGNOSIS — K22.70 BARRETT ESOPHAGUS: ICD-10-CM

## 2020-08-04 DIAGNOSIS — K22.70 BARRETT'S ESOPHAGUS WITHOUT DYSPLASIA: ICD-10-CM

## 2020-08-04 PROCEDURE — U0003 INFECTIOUS AGENT DETECTION BY NUCLEIC ACID (DNA OR RNA); SEVERE ACUTE RESPIRATORY SYNDROME CORONAVIRUS 2 (SARS-COV-2) (CORONAVIRUS DISEASE [COVID-19]), AMPLIFIED PROBE TECHNIQUE, MAKING USE OF HIGH THROUGHPUT TECHNOLOGIES AS DESCRIBED BY CMS-2020-01-R: HCPCS

## 2020-08-05 ENCOUNTER — ANESTHESIA EVENT (OUTPATIENT)
Dept: ENDOSCOPY | Facility: HOSPITAL | Age: 45
End: 2020-08-05
Payer: COMMERCIAL

## 2020-08-05 LAB — SARS-COV-2 RNA RESP QL NAA+PROBE: NOT DETECTED

## 2020-08-05 NOTE — ANESTHESIA PREPROCEDURE EVALUATION
08/05/2020  Gideon Ribera Jr. is a 45 y.o., male.    Anesthesia Evaluation    I have reviewed the Patient Summary Reports.    I have reviewed the Nursing Notes. I have reviewed the NPO Status.   I have reviewed the Medications.     Review of Systems  Anesthesia Hx:  No problems with previous Anesthesia  History of prior surgery of interest to airway management or planning: Previous anesthesia: MAC  2018 - EGD with MAC.  Procedure performed at an [facility] Facility.  Denies Personal Hx of Anesthesia complications.   Social:  Non-Smoker, Social Alcohol Use    Cardiovascular:   hyperlipidemia ECG has been reviewed.    Hepatic/GI:   GERD, well controlled Castano's esophagus.        Physical Exam  General:  Well nourished    Airway/Jaw/Neck:  Airway Findings: General Airway Assessment: Adult           Mental Status:  Mental Status Findings:  Cooperative, Alert and Oriented         Anesthesia Plan  Type of Anesthesia, risks & benefits discussed:  Anesthesia Type:  MAC  Patient's Preference:   Intra-op Monitoring Plan: standard ASA monitors  Intra-op Monitoring Plan Comments:   Post Op Pain Control Plan:   Post Op Pain Control Plan Comments:   Induction:   IV  Beta Blocker:  Patient is not currently on a Beta-Blocker (No further documentation required).       Informed Consent: Patient understands risks and agrees with Anesthesia plan.  Questions answered. Anesthesia consent signed with patient.  ASA Score: 1     Day of Surgery Review of History & Physical:    H&P update referred to the provider.         Ready For Surgery From Anesthesia Perspective.

## 2020-08-06 NOTE — PRE ADMISSION SCREENING
Attempt PAT call x 2, voicemail left with arrival time of 0830, location, NPO instruction and a call back number.   General

## 2020-08-07 ENCOUNTER — HOSPITAL ENCOUNTER (OUTPATIENT)
Facility: HOSPITAL | Age: 45
Discharge: HOME | End: 2020-08-07
Attending: INTERNAL MEDICINE | Admitting: INTERNAL MEDICINE
Payer: COMMERCIAL

## 2020-08-07 ENCOUNTER — ANESTHESIA (OUTPATIENT)
Dept: ENDOSCOPY | Facility: HOSPITAL | Age: 45
End: 2020-08-07
Payer: COMMERCIAL

## 2020-08-07 VITALS
OXYGEN SATURATION: 100 % | HEART RATE: 66 BPM | DIASTOLIC BLOOD PRESSURE: 75 MMHG | TEMPERATURE: 98 F | HEIGHT: 71 IN | SYSTOLIC BLOOD PRESSURE: 129 MMHG | RESPIRATION RATE: 14 BRPM | BODY MASS INDEX: 28.49 KG/M2 | WEIGHT: 203.5 LBS

## 2020-08-07 DIAGNOSIS — K22.70 BARRETT'S ESOPHAGUS: ICD-10-CM

## 2020-08-07 DIAGNOSIS — K21.9 GASTROESOPHAGEAL REFLUX DISEASE WITHOUT ESOPHAGITIS: ICD-10-CM

## 2020-08-07 DIAGNOSIS — K44.9 DIAPHRAGMATIC HERNIA WITHOUT OBSTRUCTION AND WITHOUT GANGRENE: Chronic | ICD-10-CM

## 2020-08-07 DIAGNOSIS — K22.70 BARRETT'S ESOPHAGUS WITHOUT DYSPLASIA: Primary | ICD-10-CM

## 2020-08-07 PROCEDURE — 63600175 PHARM REV CODE 636 W HCPCS: Performed by: INTERNAL MEDICINE

## 2020-08-07 PROCEDURE — 43239 PR EGD, FLEX, W/BIOPSY, SGL/MULTI: ICD-10-PCS | Mod: ,,, | Performed by: INTERNAL MEDICINE

## 2020-08-07 PROCEDURE — 63600175 PHARM REV CODE 636 W HCPCS

## 2020-08-07 PROCEDURE — 88305 TISSUE EXAM BY PATHOLOGIST: CPT | Mod: 59 | Performed by: PATHOLOGY

## 2020-08-07 PROCEDURE — 43239 EGD BIOPSY SINGLE/MULTIPLE: CPT | Performed by: INTERNAL MEDICINE

## 2020-08-07 PROCEDURE — D9220A PRA ANESTHESIA: Mod: CRNA,,, | Performed by: NURSE ANESTHETIST, CERTIFIED REGISTERED

## 2020-08-07 PROCEDURE — 27201012 HC FORCEPS, HOT/COLD, DISP: Performed by: INTERNAL MEDICINE

## 2020-08-07 PROCEDURE — D9220A PRA ANESTHESIA: ICD-10-PCS | Mod: ANES,,, | Performed by: ANESTHESIOLOGY

## 2020-08-07 PROCEDURE — 43239 EGD BIOPSY SINGLE/MULTIPLE: CPT | Mod: ,,, | Performed by: INTERNAL MEDICINE

## 2020-08-07 PROCEDURE — 63600175 PHARM REV CODE 636 W HCPCS: Performed by: NURSE ANESTHETIST, CERTIFIED REGISTERED

## 2020-08-07 PROCEDURE — 37000009 HC ANESTHESIA EA ADD 15 MINS: Performed by: INTERNAL MEDICINE

## 2020-08-07 PROCEDURE — 88305 TISSUE EXAM BY PATHOLOGIST: ICD-10-PCS | Mod: 26,,, | Performed by: PATHOLOGY

## 2020-08-07 PROCEDURE — D9220A PRA ANESTHESIA: ICD-10-PCS | Mod: CRNA,,, | Performed by: NURSE ANESTHETIST, CERTIFIED REGISTERED

## 2020-08-07 PROCEDURE — D9220A PRA ANESTHESIA: Mod: ANES,,, | Performed by: ANESTHESIOLOGY

## 2020-08-07 PROCEDURE — 37000008 HC ANESTHESIA 1ST 15 MINUTES: Performed by: INTERNAL MEDICINE

## 2020-08-07 PROCEDURE — 88305 TISSUE EXAM BY PATHOLOGIST: CPT | Mod: 26,,, | Performed by: PATHOLOGY

## 2020-08-07 RX ORDER — PANTOPRAZOLE SODIUM 40 MG/1
40 TABLET, DELAYED RELEASE ORAL DAILY
Qty: 30 TABLET | Refills: 3 | Status: SHIPPED | OUTPATIENT
Start: 2020-08-07 | End: 2021-06-25

## 2020-08-07 RX ORDER — LIDOCAINE HCL/PF 100 MG/5ML
SYRINGE (ML) INTRAVENOUS
Status: DISCONTINUED | OUTPATIENT
Start: 2020-08-07 | End: 2020-08-07

## 2020-08-07 RX ORDER — SODIUM CHLORIDE 0.9 % (FLUSH) 0.9 %
10 SYRINGE (ML) INJECTION
Status: DISCONTINUED | OUTPATIENT
Start: 2020-08-07 | End: 2020-08-07 | Stop reason: HOSPADM

## 2020-08-07 RX ORDER — PROPOFOL 10 MG/ML
VIAL (ML) INTRAVENOUS
Status: DISCONTINUED | OUTPATIENT
Start: 2020-08-07 | End: 2020-08-07

## 2020-08-07 RX ORDER — SODIUM CHLORIDE, SODIUM LACTATE, POTASSIUM CHLORIDE, CALCIUM CHLORIDE 600; 310; 30; 20 MG/100ML; MG/100ML; MG/100ML; MG/100ML
INJECTION, SOLUTION INTRAVENOUS CONTINUOUS
Status: DISCONTINUED | OUTPATIENT
Start: 2020-08-07 | End: 2020-08-07 | Stop reason: HOSPADM

## 2020-08-07 RX ADMIN — PROPOFOL 50 MG: 10 INJECTION, EMULSION INTRAVENOUS at 09:08

## 2020-08-07 RX ADMIN — SODIUM CHLORIDE, SODIUM LACTATE, POTASSIUM CHLORIDE, AND CALCIUM CHLORIDE: 600; 310; 30; 20 INJECTION, SOLUTION INTRAVENOUS at 09:08

## 2020-08-07 RX ADMIN — PROPOFOL 100 MG: 10 INJECTION, EMULSION INTRAVENOUS at 09:08

## 2020-08-07 RX ADMIN — PROPOFOL 25 MG: 10 INJECTION, EMULSION INTRAVENOUS at 09:08

## 2020-08-07 RX ADMIN — Medication 50 MG: at 09:08

## 2020-08-07 NOTE — ANESTHESIA POSTPROCEDURE EVALUATION
Anesthesia Post Evaluation    Patient: Gideon Ribera Jr.    Procedure(s) Performed: Procedure(s) (LRB):  ESOPHAGOGASTRODUODENOSCOPY (EGD) (N/A)    Final Anesthesia Type: MAC    Patient location during evaluation: GI PACU  Patient participation: Yes- Able to Participate  Level of consciousness: awake and alert and oriented  Post-procedure vital signs: reviewed and stable  Pain management: adequate  Airway patency: patent    PONV status at discharge: No PONV  Anesthetic complications: no      Cardiovascular status: hemodynamically stable  Respiratory status: unassisted, spontaneous ventilation and room air  Hydration status: euvolemic  Follow-up not needed.          Vitals Value Taken Time   /80 08/07/20 0954   Temp  08/07/20 0954   Pulse 77 08/07/20 0954   Resp  08/07/20 0954   SpO2 99 08/07/20 0954         No case tracking events are documented in the log.      Pain/Petey Score: No data recorded

## 2020-08-07 NOTE — DISCHARGE SUMMARY
Endoscopy Discharge Summary      Admit Date: 8/7/2020    Discharge Date and Time:  8/7/2020 9:42 AM    Attending Physician: Salud Dale MD     Discharge Physician: Salud Dale MD     Principal Admitting Diagnoses: Castano's esophagus         Discharge Diagnosis: The primary encounter diagnosis was Castano's esophagus without dysplasia. Diagnoses of Diaphragmatic hernia without obstruction and without gangrene, Gastroesophageal reflux disease without esophagitis, and Castano's esophagus were also pertinent to this visit.     Discharged Condition: Good    Indication for Admission: Castano's esophagus     Hospital Course: Patient was admitted for an inpatient procedure and tolerated the procedure well with no complications.    Significant Diagnostic Studies: EGD with biopsy    Estimated Blood Loss: 1 ml.    Discussed with: patient.    Disposition: Home.    Follow Up/Patient Instructions:   Current Discharge Medication List      START taking these medications    Details   pantoprazole (PROTONIX) 40 MG tablet Take 1 tablet (40 mg total) by mouth once daily.  Qty: 30 tablet, Refills: 3             No discharge procedures on file.        Salud Dale MD  Gastroenterology and Hepatology

## 2020-08-07 NOTE — [employer] PATIENT INSTRUCTIONS
Discharge Summary/Instructions after an Endoscopic Procedure  Patient Name: Gideon Ribera  Patient MRN: [id number]  Patient YOB: 1975 Friday, August 7, 2020  Salud Dale MD  RESTRICTIONS:  During your procedure today, you received medications for sedation.  These   medications may affect your judgment, balance and coordination.  Therefore,   for 24 hours, you have the following restrictions:   - DO NOT drive a car, operate machinery, make legal/financial decisions,   sign important papers or drink alcohol.    ACTIVITY:  Today: no heavy lifting, straining or running due to procedural   sedation/anesthesia.  The following day: return to full activity including work.  DIET:  Eat and drink normally unless instructed otherwise.     TREATMENT FOR COMMON SIDE EFFECTS:  - Mild abdominal pain, nausea, belching, bloating or excessive gas:  rest,   eat lightly and use a heating pad.  - Sore Throat: treat with throat lozenges and/or gargle with warm salt   water.  - Because air was used during the procedure, expelling large amounts of air   from your rectum or belching is normal.  - If a bowel prep was taken, you may not have a bowel movement for 1-3 days.    This is normal.  SYMPTOMS TO WATCH FOR AND REPORT TO YOUR PHYSICIAN:  1. Abdominal pain or bloating, other than gas cramps.  2. Chest pain.  3. Back pain.  4. Signs of infection such as: chills or fever occurring within 24 hours   after the procedure.  5. Rectal bleeding, which would show as bright red, maroon, or black stools.   (A tablespoon of blood from the rectum is not serious, especially if   hemorrhoids are present.)  6. Vomiting.  7. Weakness or dizziness.  GO DIRECTLY TO THE NEAREST EMERGENCY ROOM IF YOU HAVE ANY OF THE FOLLOWING:      Difficulty breathing              Chills and/or fever over 101 F   Persistent vomiting and/or vomiting blood   Severe abdominal pain   Severe chest pain   Black, tarry stools   Bleeding- more than one  tablespoon   Any other symptom or condition that you feel may need urgent attention  Your doctor recommends these additional instructions:  If any biopsies were taken, your doctors clinic will contact you in 1 to 2   weeks with any results.  - Discharge patient to home (via wheelchair).   - Resume previous diet.   - Continue present medications.   - Await pathology results.   - Use Protonix (pantoprazole) 40 mg PO daily.   - Telephone GI clinic for pathology results in 2 weeks.   - Repeat upper endoscopy in 3 years for surveillance.   - Patient has a contact number available for emergencies.  The signs and   symptoms of potential delayed complications were discussed with the   patient.  Return to normal activities tomorrow.  Written discharge   instructions were provided to the patient.   - Resume anticoagulant at prior dose.  For questions, problems or results please call your physician Salud Dale MD at Work:  [phone]  If you have any questions about the above instructions, call the GI   department at [phone] or call the endoscopy unit at [phone]   from 7am until 3 pm.  [facility] [place], EMERGENCY ROOM PHONE NUMBER:   [phone]  IF A COMPLICATION OR EMERGENCY SITUATION ARISES AND YOU ARE UNABLE TO REACH   YOUR PHYSICIAN - GO DIRECTLY TO THE EMERGENCY ROOM.  I have read or have had read to me these discharge instructions for my   procedure and have received a written copy.  I understand these   instructions and will follow-up with my physician if I have any questions.     __________________________________       _____________________________________  Nurse Signature                                          Patient/Designated   Responsible Party Signature  MD Salud Meyer MD  8/7/2020 9:42:08 AM  [employer]

## 2020-08-07 NOTE — TRANSFER OF CARE
"Anesthesia Transfer of Care Note    Patient: Gideon Ribera Jr.    Procedure(s) Performed: Procedure(s) (LRB):  ESOPHAGOGASTRODUODENOSCOPY (EGD) (N/A)    Patient location: PACU    Anesthesia Type: general    Transport from OR: Transported from OR on room air with adequate spontaneous ventilation    Post pain: adequate analgesia    Post assessment: no apparent anesthetic complications and tolerated procedure well    Post vital signs: stable    Level of consciousness: sedated    Nausea/Vomiting: no nausea/vomiting    Complications: none    Transfer of care protocol was followed      Last vitals:   Visit Vitals  /86   Pulse 67   Temp 36.7 °C (98.1 °F) (Temporal)   Resp 16   Ht 5' 11" (1.803 m)   Wt 92.3 kg (203 lb 7.8 oz)   SpO2 100%   BMI 28.38 kg/m²     "

## 2020-08-07 NOTE — DISCHARGE INSTRUCTIONS
Esophagitis     With esophagitis, the lining of the esophagus is inflamed.   Do you often have burning pain in your chest? You may have esophagitis. This is when the lining of the esophagus becomes red and swollen (inflamed). The esophagus is the tube that connects your throat to your stomach. This sheet tells you more about esophagitis. It also explains your treatment options.  Main types of esophagitis  Reflux esophagitis. This is the more common type. It is caused by GERD (gastroesophageal reflux disease). Stomach contents with stomach acid flow back up into the esophagus. This happens over and over. It leads to inflammation. Risk factors can include:  · Being overweight  · Asthma  · Smoking  · Pregnancy  · Frequent vomiting  · Certain medicines (such as aspirin and other anti-inflammatories)  · Hiatal hernia  Infectious esophagitis. This is caused by an infection. You are more at risk for this if you have a weakened immune system and poor nutrition. Antibiotic use can also be a factor. The infection is often due to the following:  · A type of fungus (typically candida)  · A virus, such as herpes simplex virus 1 (HSV-1) or cytomegalovirus (CMV)  Eosinophilic esophagitis. Foods or other things around you can give you an allergic reaction. This triggers an immune response and leads to esophagitis.  Pill-induced esophagitis. Certain types of medicines can cause inflammation and ulcers in the esophagus. These include doxycycline, aspirin, NSAIDs, alendronate, potassium, quinidine, iron.  Symptoms of esophagitis  The following symptoms can occur with esophagitis:  · Pain when swallowing, or trouble swallowing  · Pain behind your breastbone (heartburn)  · Acid regurgitation  · Chronic sore throat  · Gum Inflammation  · Cavities  · Bad breath  · Nausea  · Pain in your upper belly (abdomen)  · Bleeding (indicated by bright red vomit or black, tarry stool)  These symptoms occur more often with reflux  esophagitis:  · Coughing, wheezing, or asthma  · Hoarseness  Diagnosis of esophagitis  Your healthcare provider will ask about your health history and symptoms. Youll also be examined. Sometimes certain tests are needed. These may include:  · Upper endoscopy. A thin, flexible tube with a tiny light and camera is used. It is inserted through the mouth down into the esophagus. This lets the provider look for damage. A small sample of tissue (biopsy) may also be removed. The sample is sent to a lab for testing.  · Upper GI X-ray with barium. An X-ray is done after you drink a substance called barium. Barium may make problems in the esophagus easier to see on an x-ray.  · Esophageal pH. A soft, thin tube is passed into the esophagus through the nose or mouth for 24 hours. It measures the acid level in the esophagus.  · Esophageal manometry. A soft, thin tube is passed into the esophagus through the nose or mouth. It measures muscle contractions in the esophagus.  Treatment of esophagitis  Medicines. Different medicines can help treat esophagitis. The medicine used will depend on the type of esophagitis you have. Talk with your healthcare provider.  Lifestyle changes. Making the following changes can help reduce irritation and ease your symptoms:  · Avoid spicy foods (pepper, chili powder, chandler). Also avoid hard foods (nuts, crackers, raw vegetables) and acidic foods and drinks (tomatoes, citrus fruits and juices). Other problem foods include chocolate, peppermint, nutmeg, and foods high in fat.  · Until you can swallow without pain, follow a combined liquid and soft diet. Try foods such as cooked cereals, mashed potatoes, and soups.  · Take small bites and chew your food thoroughly.  · Avoid large meals and heavy evening meals. Don't lie down within 2 to 3 hours of eating.  · Get to or stay at a healthy weight.  · Avoid alcohol, caffeine, and smoking or tobacco products.  · Brush and floss your teeth  · Raise your  upper body by 4 to 6 inches when lying in bed. This can be done using a foam wedge. Or put blocks under the legs at the head of your bed.  Surgery. This may be needed for severe reflux esophagitis. Other noninvasive procedures to treat GERD and esophagitis are being studied. Your provider can tell you more.  Why treatment Is important  Without treatment, esophagitis can get worse. This is especially true with severe reflux esophagitis. For instance, continued symptoms can cause scarring of the esophagus. Over time, this can cause a narrowing the esophagus (stricture). This can make it hard to pass food down to the stomach. As symptoms go on they can also cause changes in the lining of the esophagus. These changes can put you at a slightly higher risk of cancer of the esophagus.   Date Last Reviewed: 7/1/2016 © 2000-2017 [employer]. [address] [place] [address]. All rights reserved. This information is not intended as a substitute for professional medical care. Always follow your healthcare professional's instructions.        What Is Castano Esophagus?          You have Castano esophagus. This means that there have been changes to the lining of the esophagus near the stomach. The changes may have been caused by the acid reflux that happens with GERD (gastroesophageal reflux disease). The changed lining is not cancerous, but may increase your chances of developing cancer later on.      When you have GERD  The esophagus is the tube that carries food and liquid from the mouth to the stomach. Your lower esophageal sphincter (LES) is a one-way valve at the top of the stomach. It keeps food and stomach acid from flowing backward. If the LES is weakened, food and stomach acid flow back (reflux) into your esophagus. If this happens often, the condition is called GERD.  Changes in the lining  The stomach is kept safe from its own acid by a special lining. The esophagus isnt meant to contact  stomach acid. With GERD, acid flows back into the esophagus often. This damages the esophagus. In response to the damage, new tissue forms that is not normal. This is Castano esophagus. The new tissue may keep changing. This is why it is more likely to become cancer in the future.  Preventing further damage  Your healthcare provider may suggest regular tests to keep track of changes in the esophagus. This usually includes an endoscopy, when a flexible lighted scope is placed through the mouth into the esophagus. Biopsies (tissue samples) can be taken of the abnormal areas. You are usually sedated with an IV medicine for comfort. He or she may also suggest ways for you to control GERD. This includes lifestyle changes, medicine, or even surgery. This should help keep your Castano esophagus from getting worse.  Symptoms of GERD  Symptoms include the following:  · Heartburn  · Sour-tasting fluid backing up into your mouth  · Frequent burping or belching  · Symptoms that get worse after you eat, bend over, or lie down  · Coughing repeatedly to clear your throat  · Hoarseness   Date Last Reviewed: 6/1/2016  © 3513-6596 [employer]. [address] [place] [address], [place] [address]. All rights reserved. This information is not intended as a substitute for professional medical care. Always follow your healthcare professional's instructions.

## 2020-08-07 NOTE — H&P
Short Stay Endoscopy History and Physical    PCP - Josesito Dunn MD    Procedure - EGD  ASA - 2  Mallampati - per anesthesia  History of Anesthesia problems - no  Family history Anesthesia problems -  no     HPI:  This is a 45 y.o. male here for evaluation of :   Active Hospital Problems    Diagnosis  POA    *Castano's esophagus [K22.70]  Yes     EGD 07/27/2018 (Biopsy NEGATIVE for dysplasia.)  Findings:  The esophagus and gastroesophageal junction were examined with white light and narrow band imaging (NBI) from a forward view and retroflexed position. There were esophageal mucosal changes secondary to established short-segment Castano's disease. These changes involved the mucosa along an irregular Z-line (39 cm from the incisors). Four tongues of salmon-colored mucosa were present from 39 to 40 cm and hiatal narrowing was identified at 45 cm. The maximum longitudinal extent of these esophageal mucosal changes was 1 cm in length. Mucosa was biopsied with a cold forceps for histology in a targeted manner 0.5 cm proximal to the GE junction. One specimen bottle was sent to pathology. Estimated blood loss was minimal.    Patchy mild inflammation characterized by congestion (edema) and erythema was found in the gastric antrum and in the prepyloric region of the stomach. Biopsies were taken with a cold forceps for histology. Biopsies were taken with a cold forceps for Helicobacter pylori testing. Estimated blood loss was minimal.   No other significant abnormalities were identified in a careful examination of the stomach.   The duodenal bulb and second portion of the duodenum were normal.     Impression:   - Esophageal mucosal changes secondary to established short-segment Castano's disease. Biopsied.    - Gastritis. Biopsied.    - Normal duodenal bulb and second portion of the duodenum.     Recommendation: Repeat upper endoscopy in 2 years for surveillance.         Resolved Hospital Problems   No resolved problems  to display.         Reflux - yes  Dysphagia - no  Abdominal pain - no  Diarrhea - no  Anemia - no  GI bleeding - no    ROS:  CONSTITUTIONAL: Denies weight change,  fatigue, fevers, chills, night sweats.  CARDIOVASCULAR: Denies chest pain, shortness of breath, orthopnea and edema.  RESPIRATORY: Denies cough, hemoptysis, dyspnea, and wheezing.  GI: See HPI.    Medical History:   Past Medical History:   Diagnosis Date    Castano esophagus     last egd - 2014 looks normal    GERD (gastroesophageal reflux disease)     Right bundle branch block 6/26/2020    Sinus bradycardia 6/26/2020       Surgical History:   Past Surgical History:   Procedure Laterality Date    egd  2014    ESOPHAGOGASTRODUODENOSCOPY N/A 7/27/2018    Procedure: ESOPHAGOGASTRODUODENOSCOPY (EGD);  Surgeon: Gerardo Stockton III, MD;  [facility];  Service: Endoscopy;  Laterality: N/A;       Family History:  Family History   Problem Relation Age of Onset    Crohn's disease Mother     Cancer Father         mantle cell lymphoma at 60    Diabetes Maternal Grandmother     Diabetes Paternal Grandmother     Diabetes Paternal Grandfather     Prostate cancer Paternal Grandfather        Social History:   Social History     Tobacco Use    Smoking status: Never Smoker    Smokeless tobacco: Never Used   Substance Use Topics    Alcohol use: Yes     Comment: occas etoh use    Drug use: No       Allergy  Review of patient's allergies indicates:  No Known Allergies    Medications:   No current facility-administered medications on file prior to encounter.      Current Outpatient Medications on File Prior to Encounter   Medication Sig Dispense Refill    pantoprazole (PROTONIX) 20 MG tablet Take 2 tablets (40 mg total) by mouth once daily. (Patient taking differently: Take 20 mg by mouth as needed. ) 30 tablet 11       Physical Exam:  Vital Signs:   Vitals:    08/07/20 0841   BP: 137/86   Pulse:    Resp:    Temp:      General Appearance: Well  appearing in no acute distress  ENT: OP clear  Chest: CTA B  CV: RRR, no m/r/g  Abd: s/nt/nd/nabs  Ext: no edema    Labs:  Reviewed    IMP:  Active Hospital Problems    Diagnosis  POA    *Castano's esophagus [K22.70]  Yes     EGD 07/27/2018 (Biopsy NEGATIVE for dysplasia.)  Findings:  The esophagus and gastroesophageal junction were examined with white light and narrow band imaging (NBI) from a forward view and retroflexed position. There were esophageal mucosal changes secondary to established short-segment Castano's disease. These changes involved the mucosa along an irregular Z-line (39 cm from the incisors). Four tongues of salmon-colored mucosa were present from 39 to 40 cm and hiatal narrowing was identified at 45 cm. The maximum longitudinal extent of these esophageal mucosal changes was 1 cm in length. Mucosa was biopsied with a cold forceps for histology in a targeted manner 0.5 cm proximal to the GE junction. One specimen bottle was sent to pathology. Estimated blood loss was minimal.    Patchy mild inflammation characterized by congestion (edema) and erythema was found in the gastric antrum and in the prepyloric region of the stomach. Biopsies were taken with a cold forceps for histology. Biopsies were taken with a cold forceps for Helicobacter pylori testing. Estimated blood loss was minimal.   No other significant abnormalities were identified in a careful examination of the stomach.   The duodenal bulb and second portion of the duodenum were normal.     Impression:   - Esophageal mucosal changes secondary to established short-segment Castano's disease. Biopsied.    - Gastritis. Biopsied.    - Normal duodenal bulb and second portion of the duodenum.     Recommendation: Repeat upper endoscopy in 2 years for surveillance.         Resolved Hospital Problems   No resolved problems to display.         Plan:  I have explained the risks and benefits of endoscopy procedures to the patient including but not  limited to bleeding, perforation, infection, and death. The patient wishes to proceed.

## 2020-08-07 NOTE — PLAN OF CARE
Patient d/c home in stable condition via wheelchair with ride. Verbalized understanding of d/c instructions. Patient voiced no complaints. Patient stood at side of bed, walked steps with no new motor deficits. Mother and patient understands care at home and to  medication

## 2020-08-19 LAB
FINAL PATHOLOGIC DIAGNOSIS: NORMAL
GROSS: NORMAL

## 2021-06-18 DIAGNOSIS — Z00.00 ROUTINE GENERAL MEDICAL EXAMINATION AT A HEALTH CARE FACILITY: Primary | ICD-10-CM

## 2021-06-25 ENCOUNTER — OFFICE VISIT (OUTPATIENT)
Dept: INTERNAL MEDICINE | Facility: CLINIC | Age: 46
End: 2021-06-25

## 2021-06-25 ENCOUNTER — HOSPITAL ENCOUNTER (OUTPATIENT)
Dept: CARDIOLOGY | Facility: HOSPITAL | Age: 46
Discharge: HOME | End: 2021-06-25

## 2021-06-25 ENCOUNTER — CLINICAL SUPPORT (OUTPATIENT)
Dept: INTERNAL MEDICINE | Facility: CLINIC | Age: 46
End: 2021-06-25
Payer: COMMERCIAL

## 2021-06-25 ENCOUNTER — PATIENT MESSAGE (OUTPATIENT)
Dept: ENDOSCOPY | Facility: HOSPITAL | Age: 46
End: 2021-06-25

## 2021-06-25 VITALS
DIASTOLIC BLOOD PRESSURE: 80 MMHG | WEIGHT: 194 LBS | HEART RATE: 66 BPM | TEMPERATURE: 98 F | HEIGHT: 71 IN | RESPIRATION RATE: 16 BRPM | SYSTOLIC BLOOD PRESSURE: 120 MMHG | BODY MASS INDEX: 27.16 KG/M2

## 2021-06-25 DIAGNOSIS — Z00.00 ROUTINE GENERAL MEDICAL EXAMINATION AT A HEALTH CARE FACILITY: Primary | ICD-10-CM

## 2021-06-25 DIAGNOSIS — Z00.00 ROUTINE GENERAL MEDICAL EXAMINATION AT A HEALTH CARE FACILITY: ICD-10-CM

## 2021-06-25 DIAGNOSIS — R00.1 SINUS BRADYCARDIA: Chronic | ICD-10-CM

## 2021-06-25 DIAGNOSIS — K22.70 BARRETT'S ESOPHAGUS WITHOUT DYSPLASIA: ICD-10-CM

## 2021-06-25 DIAGNOSIS — I45.10 RIGHT BUNDLE BRANCH BLOCK: Chronic | ICD-10-CM

## 2021-06-25 DIAGNOSIS — Z12.11 COLON CANCER SCREENING: ICD-10-CM

## 2021-06-25 LAB
ALBUMIN SERPL BCP-MCNC: 4.3 G/DL (ref 3.5–5.2)
ALP SERPL-CCNC: 62 U/L (ref 55–135)
ALT SERPL W/O P-5'-P-CCNC: 33 U/L (ref 10–44)
ANION GAP SERPL CALC-SCNC: 10 MMOL/L (ref 8–16)
AST SERPL-CCNC: 24 U/L (ref 10–40)
BILIRUB SERPL-MCNC: 0.8 MG/DL (ref 0.1–1)
BUN SERPL-MCNC: 15 MG/DL (ref 6–20)
CALCIUM SERPL-MCNC: 9.1 MG/DL (ref 8.7–10.5)
CHLORIDE SERPL-SCNC: 103 MMOL/L (ref 95–110)
CHOLEST SERPL-MCNC: 183 MG/DL (ref 120–199)
CHOLEST/HDLC SERPL: 5.1 {RATIO} (ref 2–5)
CO2 SERPL-SCNC: 26 MMOL/L (ref 23–29)
COMPLEXED PSA SERPL-MCNC: 1.3 NG/ML (ref 0–4)
CREAT SERPL-MCNC: 1.3 MG/DL (ref 0.5–1.4)
ERYTHROCYTE [DISTWIDTH] IN BLOOD BY AUTOMATED COUNT: 12.1 % (ref 11.5–14.5)
EST. GFR  (AFRICAN AMERICAN): >60 ML/MIN/1.73 M^2
EST. GFR  (NON AFRICAN AMERICAN): >60 ML/MIN/1.73 M^2
ESTIMATED AVG GLUCOSE: 105 MG/DL (ref 68–131)
GLUCOSE SERPL-MCNC: 93 MG/DL (ref 70–110)
HBA1C MFR BLD: 5.3 % (ref 4–5.6)
HCT VFR BLD AUTO: 40.4 % (ref 40–54)
HDLC SERPL-MCNC: 36 MG/DL (ref 40–75)
HDLC SERPL: 19.7 % (ref 20–50)
HGB BLD-MCNC: 14 G/DL (ref 14–18)
LDLC SERPL CALC-MCNC: 119.8 MG/DL (ref 63–159)
MCH RBC QN AUTO: 29.5 PG (ref 27–31)
MCHC RBC AUTO-ENTMCNC: 34.7 G/DL (ref 32–36)
MCV RBC AUTO: 85 FL (ref 82–98)
NONHDLC SERPL-MCNC: 147 MG/DL
PLATELET # BLD AUTO: 234 K/UL (ref 150–450)
PMV BLD AUTO: 10.1 FL (ref 9.2–12.9)
POTASSIUM SERPL-SCNC: 4.2 MMOL/L (ref 3.5–5.1)
PROT SERPL-MCNC: 7.2 G/DL (ref 6–8.4)
RBC # BLD AUTO: 4.74 M/UL (ref 4.6–6.2)
SODIUM SERPL-SCNC: 139 MMOL/L (ref 136–145)
TRIGL SERPL-MCNC: 136 MG/DL (ref 30–150)
TSH SERPL DL<=0.005 MIU/L-ACNC: 0.87 UIU/ML (ref 0.4–4)
WBC # BLD AUTO: 5.07 K/UL (ref 3.9–12.7)

## 2021-06-25 PROCEDURE — 86803 HEPATITIS C AB TEST: CPT | Performed by: FAMILY MEDICINE

## 2021-06-25 PROCEDURE — 80061 LIPID PANEL: CPT | Performed by: INTERNAL MEDICINE

## 2021-06-25 PROCEDURE — 80053 COMPREHEN METABOLIC PANEL: CPT | Performed by: INTERNAL MEDICINE

## 2021-06-25 PROCEDURE — 84153 ASSAY OF PSA TOTAL: CPT | Performed by: INTERNAL MEDICINE

## 2021-06-25 PROCEDURE — 84443 ASSAY THYROID STIM HORMONE: CPT | Performed by: INTERNAL MEDICINE

## 2021-06-25 PROCEDURE — 99396 PR PREVENTIVE VISIT,EST,40-64: ICD-10-PCS | Mod: S$GLB,,, | Performed by: FAMILY MEDICINE

## 2021-06-25 PROCEDURE — 99999 PR PBB SHADOW E&M-EST. PATIENT-LVL III: ICD-10-PCS | Mod: PBBFAC,,, | Performed by: FAMILY MEDICINE

## 2021-06-25 PROCEDURE — 99396 PREV VISIT EST AGE 40-64: CPT | Mod: S$GLB,,, | Performed by: FAMILY MEDICINE

## 2021-06-25 PROCEDURE — 93010 EKG 12-LEAD: ICD-10-PCS | Mod: ,,, | Performed by: INTERNAL MEDICINE

## 2021-06-25 PROCEDURE — 85027 COMPLETE CBC AUTOMATED: CPT | Performed by: INTERNAL MEDICINE

## 2021-06-25 PROCEDURE — 99999 PR PBB SHADOW E&M-EST. PATIENT-LVL III: CPT | Mod: PBBFAC,,, | Performed by: FAMILY MEDICINE

## 2021-06-25 PROCEDURE — 83036 HEMOGLOBIN GLYCOSYLATED A1C: CPT | Performed by: INTERNAL MEDICINE

## 2021-06-25 PROCEDURE — 93005 ELECTROCARDIOGRAM TRACING: CPT

## 2021-06-25 PROCEDURE — 93010 ELECTROCARDIOGRAM REPORT: CPT | Mod: ,,, | Performed by: INTERNAL MEDICINE

## 2021-06-25 RX ORDER — SODIUM, POTASSIUM,MAG SULFATES 17.5-3.13G
1 SOLUTION, RECONSTITUTED, ORAL ORAL DAILY
Qty: 1 KIT | Refills: 0 | Status: SHIPPED | OUTPATIENT
Start: 2021-06-25 | End: 2021-06-27

## 2021-06-28 LAB — HCV AB SERPL QL IA: NEGATIVE

## 2021-07-23 ENCOUNTER — OFFICE VISIT (OUTPATIENT)
Dept: OPHTHALMOLOGY | Facility: CLINIC | Age: 46
End: 2021-07-23
Payer: COMMERCIAL

## 2021-07-23 DIAGNOSIS — H53.8 BLURRED VISION, BILATERAL: Primary | ICD-10-CM

## 2021-07-23 DIAGNOSIS — H52.4 PRESBYOPIA: ICD-10-CM

## 2021-07-23 PROCEDURE — 92014 COMPRE OPH EXAM EST PT 1/>: CPT | Mod: S$GLB,,, | Performed by: OPTOMETRIST

## 2021-07-23 PROCEDURE — 92015 DETERMINE REFRACTIVE STATE: CPT | Mod: S$GLB,,, | Performed by: OPTOMETRIST

## 2021-07-23 PROCEDURE — 92014 PR EYE EXAM, EST PATIENT,COMPREHESV: ICD-10-PCS | Mod: S$GLB,,, | Performed by: OPTOMETRIST

## 2021-07-23 PROCEDURE — 99999 PR PBB SHADOW E&M-EST. PATIENT-LVL I: ICD-10-PCS | Mod: PBBFAC,,, | Performed by: OPTOMETRIST

## 2021-07-23 PROCEDURE — 92015 PR REFRACTION: ICD-10-PCS | Mod: S$GLB,,, | Performed by: OPTOMETRIST

## 2021-07-23 PROCEDURE — 99999 PR PBB SHADOW E&M-EST. PATIENT-LVL I: CPT | Mod: PBBFAC,,, | Performed by: OPTOMETRIST

## 2021-12-13 ENCOUNTER — PATIENT MESSAGE (OUTPATIENT)
Dept: ENDOSCOPY | Facility: HOSPITAL | Age: 46
End: 2021-12-13
Payer: COMMERCIAL

## 2021-12-13 RX ORDER — SODIUM, POTASSIUM,MAG SULFATES 17.5-3.13G
1 SOLUTION, RECONSTITUTED, ORAL ORAL DAILY
Qty: 1 KIT | Refills: 0 | Status: SHIPPED | OUTPATIENT
Start: 2021-12-13 | End: 2021-12-15

## 2021-12-21 ENCOUNTER — ANESTHESIA EVENT (OUTPATIENT)
Dept: ENDOSCOPY | Facility: HOSPITAL | Age: 46
End: 2021-12-21
Payer: COMMERCIAL

## 2021-12-21 ENCOUNTER — HOSPITAL ENCOUNTER (OUTPATIENT)
Facility: HOSPITAL | Age: 46
Discharge: HOME | End: 2021-12-21
Attending: FAMILY MEDICINE | Admitting: FAMILY MEDICINE
Payer: COMMERCIAL

## 2021-12-21 ENCOUNTER — ANESTHESIA (OUTPATIENT)
Dept: ENDOSCOPY | Facility: HOSPITAL | Age: 46
End: 2021-12-21
Payer: COMMERCIAL

## 2021-12-21 DIAGNOSIS — Z12.11 COLON CANCER SCREENING: Primary | ICD-10-CM

## 2021-12-21 DIAGNOSIS — K64.8 INTERNAL HEMORRHOIDS: ICD-10-CM

## 2021-12-21 PROCEDURE — G0121 COLON CA SCRN NOT HI RSK IND: HCPCS | Mod: ,,, | Performed by: FAMILY MEDICINE

## 2021-12-21 PROCEDURE — G0121 COLON CA SCRN NOT HI RSK IND: ICD-10-PCS | Mod: ,,, | Performed by: FAMILY MEDICINE

## 2021-12-21 PROCEDURE — 63600175 PHARM REV CODE 636 W HCPCS: Performed by: NURSE ANESTHETIST, CERTIFIED REGISTERED

## 2021-12-21 PROCEDURE — 25000003 PHARM REV CODE 250: Performed by: NURSE ANESTHETIST, CERTIFIED REGISTERED

## 2021-12-21 PROCEDURE — 37000008 HC ANESTHESIA 1ST 15 MINUTES: Performed by: FAMILY MEDICINE

## 2021-12-21 PROCEDURE — 37000009 HC ANESTHESIA EA ADD 15 MINS: Performed by: FAMILY MEDICINE

## 2021-12-21 PROCEDURE — G0121 COLON CA SCRN NOT HI RSK IND: HCPCS | Performed by: FAMILY MEDICINE

## 2021-12-21 RX ORDER — LIDOCAINE HYDROCHLORIDE 10 MG/ML
INJECTION, SOLUTION EPIDURAL; INFILTRATION; INTRACAUDAL; PERINEURAL
Status: DISCONTINUED | OUTPATIENT
Start: 2021-12-21 | End: 2021-12-21

## 2021-12-21 RX ORDER — PROPOFOL 10 MG/ML
VIAL (ML) INTRAVENOUS
Status: DISCONTINUED | OUTPATIENT
Start: 2021-12-21 | End: 2021-12-21

## 2021-12-21 RX ADMIN — LIDOCAINE HYDROCHLORIDE 50 MG: 10 INJECTION, SOLUTION EPIDURAL; INFILTRATION; INTRACAUDAL; PERINEURAL at 07:12

## 2021-12-21 RX ADMIN — PROPOFOL 50 MG: 10 INJECTION, EMULSION INTRAVENOUS at 07:12

## 2021-12-21 RX ADMIN — SODIUM CHLORIDE, SODIUM LACTATE, POTASSIUM CHLORIDE, AND CALCIUM CHLORIDE: .6; .31; .03; .02 INJECTION, SOLUTION INTRAVENOUS at 07:12

## 2021-12-21 RX ADMIN — PROPOFOL 100 MG: 10 INJECTION, EMULSION INTRAVENOUS at 07:12

## 2021-12-22 VITALS
WEIGHT: 205 LBS | BODY MASS INDEX: 28.7 KG/M2 | SYSTOLIC BLOOD PRESSURE: 128 MMHG | TEMPERATURE: 98 F | OXYGEN SATURATION: 98 % | HEART RATE: 68 BPM | HEIGHT: 71 IN | RESPIRATION RATE: 15 BRPM | DIASTOLIC BLOOD PRESSURE: 75 MMHG

## 2022-04-27 ENCOUNTER — PATIENT MESSAGE (OUTPATIENT)
Dept: ADMINISTRATIVE | Facility: HOSPITAL | Age: 47
End: 2022-04-27
Payer: COMMERCIAL

## 2022-06-22 DIAGNOSIS — Z00.00 ROUTINE GENERAL MEDICAL EXAMINATION AT A HEALTH CARE FACILITY: Primary | ICD-10-CM

## 2022-07-21 ENCOUNTER — CLINICAL SUPPORT (OUTPATIENT)
Dept: INTERNAL MEDICINE | Facility: CLINIC | Age: 47
End: 2022-07-21
Payer: COMMERCIAL

## 2022-07-21 ENCOUNTER — HOSPITAL ENCOUNTER (OUTPATIENT)
Dept: CARDIOLOGY | Facility: HOSPITAL | Age: 47
Discharge: HOME | End: 2022-07-21
Attending: FAMILY MEDICINE
Payer: COMMERCIAL

## 2022-07-21 ENCOUNTER — OFFICE VISIT (OUTPATIENT)
Dept: INTERNAL MEDICINE | Facility: CLINIC | Age: 47
End: 2022-07-21
Payer: COMMERCIAL

## 2022-07-21 VITALS
BODY MASS INDEX: 29.26 KG/M2 | SYSTOLIC BLOOD PRESSURE: 123 MMHG | DIASTOLIC BLOOD PRESSURE: 71 MMHG | HEART RATE: 61 BPM | HEIGHT: 71 IN | WEIGHT: 209 LBS | TEMPERATURE: 98 F

## 2022-07-21 DIAGNOSIS — Z00.00 ROUTINE GENERAL MEDICAL EXAMINATION AT A HEALTH CARE FACILITY: Primary | ICD-10-CM

## 2022-07-21 DIAGNOSIS — Z00.00 ROUTINE GENERAL MEDICAL EXAMINATION AT A HEALTH CARE FACILITY: ICD-10-CM

## 2022-07-21 PROBLEM — K64.8 INTERNAL HEMORRHOIDS: Status: RESOLVED | Noted: 2021-12-21 | Resolved: 2022-07-21

## 2022-07-21 PROBLEM — Z12.11 COLON CANCER SCREENING: Status: RESOLVED | Noted: 2021-12-21 | Resolved: 2022-07-21

## 2022-07-21 PROBLEM — K22.70 BARRETT'S ESOPHAGUS: Chronic | Status: ACTIVE | Noted: 2018-07-27

## 2022-07-21 LAB
ALBUMIN SERPL BCP-MCNC: 4.4 G/DL (ref 3.5–5.2)
ALP SERPL-CCNC: 65 U/L (ref 55–135)
ALT SERPL W/O P-5'-P-CCNC: 44 U/L (ref 10–44)
ANION GAP SERPL CALC-SCNC: 9 MMOL/L (ref 8–16)
AST SERPL-CCNC: 25 U/L (ref 10–40)
BILIRUB SERPL-MCNC: 0.6 MG/DL (ref 0.1–1)
BUN SERPL-MCNC: 12 MG/DL (ref 6–20)
CALCIUM SERPL-MCNC: 9.2 MG/DL (ref 8.7–10.5)
CHLORIDE SERPL-SCNC: 103 MMOL/L (ref 95–110)
CHOLEST SERPL-MCNC: 191 MG/DL (ref 120–199)
CHOLEST/HDLC SERPL: 4.8 {RATIO} (ref 2–5)
CO2 SERPL-SCNC: 26 MMOL/L (ref 23–29)
COMPLEXED PSA SERPL-MCNC: 1.3 NG/ML (ref 0–4)
CREAT SERPL-MCNC: 1.2 MG/DL (ref 0.5–1.4)
CV STRESS BASE HR: 74 BPM
DIASTOLIC BLOOD PRESSURE: 87 MMHG
ERYTHROCYTE [DISTWIDTH] IN BLOOD BY AUTOMATED COUNT: 12.2 % (ref 11.5–14.5)
EST. GFR  (AFRICAN AMERICAN): >60 ML/MIN/1.73 M^2
EST. GFR  (NON AFRICAN AMERICAN): >60 ML/MIN/1.73 M^2
ESTIMATED AVG GLUCOSE: 114 MG/DL (ref 68–131)
GLUCOSE SERPL-MCNC: 100 MG/DL (ref 70–110)
HBA1C MFR BLD: 5.6 % (ref 4–5.6)
HCT VFR BLD AUTO: 41.4 % (ref 40–54)
HDLC SERPL-MCNC: 40 MG/DL (ref 40–75)
HDLC SERPL: 20.9 % (ref 20–50)
HGB BLD-MCNC: 14.1 G/DL (ref 14–18)
LDLC SERPL CALC-MCNC: 117.2 MG/DL (ref 63–159)
MCH RBC QN AUTO: 29.4 PG (ref 27–31)
MCHC RBC AUTO-ENTMCNC: 34.1 G/DL (ref 32–36)
MCV RBC AUTO: 86 FL (ref 82–98)
NONHDLC SERPL-MCNC: 151 MG/DL
OHS CV CPX 1 MINUTE RECOVERY HEART RATE: 171 BPM
OHS CV CPX 85 PERCENT MAX PREDICTED HEART RATE MALE: 148
OHS CV CPX ESTIMATED METS: 15
OHS CV CPX MAX PREDICTED HEART RATE: 174
OHS CV CPX PATIENT IS FEMALE: 0
OHS CV CPX PATIENT IS MALE: 1
OHS CV CPX PEAK DIASTOLIC BLOOD PRESSURE: 83 MMHG
OHS CV CPX PEAK HEAR RATE: 181 BPM
OHS CV CPX PEAK RATE PRESSURE PRODUCT: NORMAL
OHS CV CPX PEAK SYSTOLIC BLOOD PRESSURE: 190 MMHG
OHS CV CPX PERCENT MAX PREDICTED HEART RATE ACHIEVED: 104
OHS CV CPX RATE PRESSURE PRODUCT PRESENTING: 9694
PLATELET # BLD AUTO: 230 K/UL (ref 150–450)
PMV BLD AUTO: 10.2 FL (ref 9.2–12.9)
POTASSIUM SERPL-SCNC: 4.5 MMOL/L (ref 3.5–5.1)
PROT SERPL-MCNC: 6.9 G/DL (ref 6–8.4)
RBC # BLD AUTO: 4.79 M/UL (ref 4.6–6.2)
SODIUM SERPL-SCNC: 138 MMOL/L (ref 136–145)
STRESS ECHO POST EXERCISE DUR MIN: 13 MINUTES
STRESS ECHO POST EXERCISE DUR SEC: 0 SECONDS
STRESS ST DEPRESSION: 0.2 MM
SYSTOLIC BLOOD PRESSURE: 131 MMHG
TRIGL SERPL-MCNC: 169 MG/DL (ref 30–150)
TSH SERPL DL<=0.005 MIU/L-ACNC: 0.89 UIU/ML (ref 0.4–4)
WBC # BLD AUTO: 5.32 K/UL (ref 3.9–12.7)

## 2022-07-21 PROCEDURE — 93018 CV STRESS TEST I&R ONLY: CPT | Mod: ,,, | Performed by: INTERNAL MEDICINE

## 2022-07-21 PROCEDURE — 99396 PR PREVENTIVE VISIT,EST,40-64: ICD-10-PCS | Mod: S$GLB,,, | Performed by: FAMILY MEDICINE

## 2022-07-21 PROCEDURE — 93016 EXERCISE STRESS - EKG (CUPID ONLY): ICD-10-PCS | Mod: ,,, | Performed by: INTERNAL MEDICINE

## 2022-07-21 PROCEDURE — 1159F MED LIST DOCD IN RCRD: CPT | Mod: CPTII,S$GLB,, | Performed by: FAMILY MEDICINE

## 2022-07-21 PROCEDURE — 3074F SYST BP LT 130 MM HG: CPT | Mod: CPTII,S$GLB,, | Performed by: FAMILY MEDICINE

## 2022-07-21 PROCEDURE — 3008F PR BODY MASS INDEX (BMI) DOCUMENTED: ICD-10-PCS | Mod: CPTII,S$GLB,, | Performed by: FAMILY MEDICINE

## 2022-07-21 PROCEDURE — 93016 CV STRESS TEST SUPVJ ONLY: CPT | Mod: ,,, | Performed by: INTERNAL MEDICINE

## 2022-07-21 PROCEDURE — 1159F PR MEDICATION LIST DOCUMENTED IN MEDICAL RECORD: ICD-10-PCS | Mod: CPTII,S$GLB,, | Performed by: FAMILY MEDICINE

## 2022-07-21 PROCEDURE — 3078F DIAST BP <80 MM HG: CPT | Mod: CPTII,S$GLB,, | Performed by: FAMILY MEDICINE

## 2022-07-21 PROCEDURE — 3078F PR MOST RECENT DIASTOLIC BLOOD PRESSURE < 80 MM HG: ICD-10-PCS | Mod: CPTII,S$GLB,, | Performed by: FAMILY MEDICINE

## 2022-07-21 PROCEDURE — 99999 PR PBB SHADOW E&M-EST. PATIENT-LVL III: CPT | Mod: PBBFAC,,, | Performed by: FAMILY MEDICINE

## 2022-07-21 PROCEDURE — 1160F RVW MEDS BY RX/DR IN RCRD: CPT | Mod: CPTII,S$GLB,, | Performed by: FAMILY MEDICINE

## 2022-07-21 PROCEDURE — 99999 PR PBB SHADOW E&M-EST. PATIENT-LVL III: ICD-10-PCS | Mod: PBBFAC,,, | Performed by: FAMILY MEDICINE

## 2022-07-21 PROCEDURE — 84153 ASSAY OF PSA TOTAL: CPT | Performed by: FAMILY MEDICINE

## 2022-07-21 PROCEDURE — 80053 COMPREHEN METABOLIC PANEL: CPT | Performed by: FAMILY MEDICINE

## 2022-07-21 PROCEDURE — 3074F PR MOST RECENT SYSTOLIC BLOOD PRESSURE < 130 MM HG: ICD-10-PCS | Mod: CPTII,S$GLB,, | Performed by: FAMILY MEDICINE

## 2022-07-21 PROCEDURE — 80061 LIPID PANEL: CPT | Performed by: FAMILY MEDICINE

## 2022-07-21 PROCEDURE — 1160F PR REVIEW ALL MEDS BY PRESCRIBER/CLIN PHARMACIST DOCUMENTED: ICD-10-PCS | Mod: CPTII,S$GLB,, | Performed by: FAMILY MEDICINE

## 2022-07-21 PROCEDURE — 93018 EXERCISE STRESS - EKG (CUPID ONLY): ICD-10-PCS | Mod: ,,, | Performed by: INTERNAL MEDICINE

## 2022-07-21 PROCEDURE — 84443 ASSAY THYROID STIM HORMONE: CPT | Performed by: FAMILY MEDICINE

## 2022-07-21 PROCEDURE — 93017 CV STRESS TEST TRACING ONLY: CPT

## 2022-07-21 PROCEDURE — 85027 COMPLETE CBC AUTOMATED: CPT | Performed by: FAMILY MEDICINE

## 2022-07-21 PROCEDURE — 99396 PREV VISIT EST AGE 40-64: CPT | Mod: S$GLB,,, | Performed by: FAMILY MEDICINE

## 2022-07-21 PROCEDURE — 83036 HEMOGLOBIN GLYCOSYLATED A1C: CPT | Performed by: FAMILY MEDICINE

## 2022-07-21 PROCEDURE — 3008F BODY MASS INDEX DOCD: CPT | Mod: CPTII,S$GLB,, | Performed by: FAMILY MEDICINE

## 2022-07-21 NOTE — PROGRESS NOTES
Subjective:   Patient ID: Gideon Ribera Jr. is a 47 y.o. male.  Chief Complaint:  Executive Health    Executive Health evaluation 7.  PCP Dr. Dunn  Medical history for Castano's esophagus, sinus bradycardia, and right bundle-branch block  Surgical history for multiple EGDs and a colonoscopy  No current medications  No known drug allergies  Social history employed by [employer].  .  .  One child.  Drinks 2-3 alcoholic beverages per week.  No tobacco illicit drug use.  Family history includes father, , lymphoma.  Mother, alive, Crohn's disease.  Maternal grandfather with diabetes, prostate cancer, and colon cancer.  Health maintenance includes a tetanus booster in 2016, a flu vaccine in 2021, a completed primary COVID vaccine series but no booster, and a colonoscopy in 2021.  No specific complaints or concerns today    Review of Systems   Constitutional: Negative for chills, fatigue and fever.   HENT: Negative for congestion, dental problem, ear discharge, ear pain, postnasal drip, rhinorrhea, sinus pressure, sinus pain, sore throat and trouble swallowing.    Eyes: Negative for pain, redness and visual disturbance.   Respiratory: Negative for cough, chest tightness, shortness of breath and wheezing.    Cardiovascular: Negative for chest pain, palpitations and leg swelling.   Gastrointestinal: Negative for abdominal pain, constipation, diarrhea, nausea and vomiting.   Endocrine: Negative for polydipsia, polyphagia and polyuria.   Genitourinary: Negative for difficulty urinating, dysuria, flank pain, frequency, hematuria and urgency.   Musculoskeletal: Negative for myalgias.   Skin: Negative for rash.   Neurological: Negative for dizziness, weakness, light-headedness and headaches.   Hematological: Negative for adenopathy.   Psychiatric/Behavioral: Negative for sleep disturbance. The patient is not nervous/anxious.      Objective:   /71   Pulse 61   Temp 97.5 °F (36.4  "°C) (Tympanic)   Ht 5' 11" (1.803 m)   Wt 94.8 kg (209 lb)   BMI 29.15 kg/m²     Physical Exam  Vitals and nursing note reviewed.   Constitutional:       Appearance: Normal appearance. He is well-developed.   HENT:      Right Ear: Hearing, tympanic membrane, ear canal and external ear normal.      Left Ear: Hearing, tympanic membrane, ear canal and external ear normal.      Nose: No mucosal edema, congestion or rhinorrhea.      Right Turbinates: Not enlarged or swollen.      Left Turbinates: Not enlarged or swollen.      Right Sinus: No maxillary sinus tenderness or frontal sinus tenderness.      Left Sinus: No maxillary sinus tenderness or frontal sinus tenderness.      Mouth/Throat:      Mouth: No oral lesions.      Pharynx: Oropharynx is clear. Uvula midline.      Tonsils: No tonsillar exudate.   Eyes:      General: No scleral icterus.        Right eye: No discharge.         Left eye: No discharge.      Conjunctiva/sclera: Conjunctivae normal.      Right eye: Right conjunctiva is not injected.      Left eye: Left conjunctiva is not injected.   Neck:      Thyroid: No thyroid mass, thyromegaly or thyroid tenderness.      Vascular: No JVD.   Cardiovascular:      Rate and Rhythm: Normal rate and regular rhythm.      Pulses:           Radial pulses are 2+ on the right side and 2+ on the left side.      Heart sounds: Normal heart sounds. No murmur heard.    No friction rub. No gallop.   Pulmonary:      Effort: Pulmonary effort is normal. No accessory muscle usage or respiratory distress.      Breath sounds: Normal breath sounds.   Abdominal:      General: There is no distension.      Palpations: Abdomen is soft.      Tenderness: There is no abdominal tenderness.   Musculoskeletal:      Right lower leg: No edema.      Left lower leg: No edema.   Lymphadenopathy:      Cervical: No cervical adenopathy.   Skin:     Findings: No rash.   Neurological:      Mental Status: He is alert and oriented to person, place, and " time.      Coordination: Coordination is intact.      Gait: Gait is intact.   Psychiatric:         Attention and Perception: Attention normal.         Mood and Affect: Mood normal.         Behavior: Behavior is cooperative.         Cognition and Memory: Cognition normal.     CBC, CMP, lipids, A1c, TSH, PSA pending    Stress test pending    Assessment:       ICD-10-CM ICD-9-CM   1. Routine general medical examination at a health care facility  Z00.00 V70.0     Plan:   Routine general medical examination at a health care facility    Blood pressure normal.  BMI 29.  Remainder exam stable.  Send full executive Health letter when all test resulted.  Colon cancer screening up-to-date  Prostate cancer screening today  Tetanus booster up-to-date  Eligible for COVID booster  Flu vaccine up-to-date  Return to clinic 1 year for executive Health evaluation or sooner as needed

## 2022-07-21 NOTE — LETTER
2022    Gideon Ribera   [address] [facility] [place] [address]             The [address]  [address] [facility] [address]  Phone: [phone]  Fax: [phone] Dear Mr. Ribera,    On 2022, it was a pleasure to meet you and perform your seventh Executive Health evaluation. Your Primary Care physician is Dr. Josesito Dunn. At the time of this visit, you are 47 years old. You denied any specific complaints or concerns during todays visit.      YOUR PAST MEDICAL HISTORY includes Castano's esophagitis, sinus bradycardia, right bundle-branch block.    YOUR PAST SURGICAL HISTORY includes multiple EGDs and a colonoscopy.    You do not take ANY REGULAR MEDICATIONS.    You do not have ANY KNOWN DRUG ALLERGIES.    YOUR SOCIAL HISTORY includes employment by [employer]. You are  with one daughter. You drink 2-3 alcoholic beverages per week. You denied any tobacco or illicit drug use.    YOUR FAMILY HISTORY includes your father, , with lymphoma. Your mother, alive, with Crohn's disease. Your paternal grandfather had diabetes, prostate cancer, and colon cancer.    YOUR ROUTINE HEALTH MAINTENANCE includes a tetanus booster in 2016, a flu vaccine in 2021, a completed primary COVID vaccine series but no booster, and a colonoscopy in 2021.    PHYSICAL EXAMINATION: You are 5 ft 11 in tall, weighing 209 pounds with a body mass index of 29. This places you in the Overweight category. Your blood pressure is 123/71. Your heart rate is 61 beats per minute. Your respiratory rate is 14 per minute. All of these are normal values. Your physical examination did not reveal any significant abnormal findings.    YOUR BLOOD WORK AND ADDITIONAL TESTS: Reveal normal white cell counts, red cell counts, and platelet levels. You are not Anemic. Your glucose, kidney, liver, and electrolyte tests are normal. Your total cholesterol is 191. Your triglycerides are  169. Your HDL is 40. Your LDL is 117.  Based on these numbers, your 10-year risk of heart attack or stroke is 3%. Your hemoglobin A1c is 5.6% in, which is in a normal range. You do not have Prediabetes or Diabetes. Your TSH is 0.894, which is in a normal range. You do not have an active Thyroid problem.  Your serum PSA is 1.3, which is in a normal range. You do not have any suspicions for Prostate cancer.     Your Exercise stress shows a hypertensive response to exercise, but otherwise is negative per Cardiology review.    In summary, you are overall in very good health. Your 10-year risk of heart attack or stroke is low. Daily aspirin or cholesterol medications are not recommended.    Based on the [place] Preventive Task Force recommendations, you should receive yearly Influenza vaccinations. You should have a Tetanus booster every 10 years. You are eligible for a COVID booster.  Your next colonoscopy for colon cancer screening is due in 2031. Repeat EGDs for your parents esophagitis should be performed at intervals recommended by your gastroenterologist.    It was a pleasure to meet you and perform this Executive Health evaluation. If I can be of any further assistance, or if you have any additional questions or concerns, please do not hesitate to let me know.    Sincerely,      Salvador Rodriguez MD, FAAFP

## 2022-07-22 ENCOUNTER — TELEPHONE (OUTPATIENT)
Dept: INTERNAL MEDICINE | Facility: CLINIC | Age: 47
End: 2022-07-22
Payer: COMMERCIAL

## 2022-07-22 NOTE — TELEPHONE ENCOUNTER
Spoke with patient; MA was calling in regards to TSH and PSA lab notes; patient stated he reviewed most results on mychart; pt verbalized understanding of providers notes and didn't voice any concerns /LD

## 2022-07-22 NOTE — TELEPHONE ENCOUNTER
----- Message from Salvador Rodriguez MD sent at 7/21/2022  6:09 PM CDT -----  Blood Counts are normal. No significant anemia.  Sugar, Kidney, Liver, and Electrolyte tests are all normal.  Cholesterol tests are elevated. 10-year risk of a heart disease or stroke is 3%. Aspirin or Statin cholesterol medications are not recommended. A heart healthy cholesterol diet, regular exercise, avoid any nicotine, and weight loss recommended.  Thyroid testing is normal.  PSA level is within the normal range.  No suspicions for prostate cancer.   Notified through executive health letter

## 2022-07-22 NOTE — TELEPHONE ENCOUNTER
----- Message from Monalisa Hitchcock sent at 7/22/2022 10:22 AM CDT -----  Regarding: Missed call  Contact: Patient  .Type:  Patient Returning Call    Who Called:Patient  Who Left Message for Patient:  KIRK   Does the patient know what this is regarding?: Results  Would the patient rather a call back or a response via [facility]? Call  Best Call Back Number:...[phone]    Additional Information:

## 2023-06-01 DIAGNOSIS — Z00.00 ROUTINE GENERAL MEDICAL EXAMINATION AT A HEALTH CARE FACILITY: Primary | ICD-10-CM

## 2023-06-16 NOTE — PROGRESS NOTES
Subjective:      Patient ID: Gideon Ribera Jr. is a 48 y.o. male.    Chief Complaint: No chief complaint on file.      HPI  Patient is here for Executive physical with [employer].  Biking for exercise at least 3x per week.  Denies stress.  Energy good.  No f/c/sw/cough.  No cp/sob/palp.  BMs normal.  Urine normal.          Past Medical History:   Diagnosis Date    Castano esophagus     last egd - 2014 looks normal    GERD (gastroesophageal reflux disease)     Right bundle branch block 6/26/2020    Sinus bradycardia 6/26/2020     Past Surgical History:   Procedure Laterality Date    COLONOSCOPY N/A 12/21/2021    Procedure: COLONOSCOPY;  Surgeon: Mateo Shah MD;  Location: [facility];  Service: Endoscopy;  Laterality: N/A;    ESOPHAGOGASTRODUODENOSCOPY N/A 7/27/2018    Procedure: ESOPHAGOGASTRODUODENOSCOPY (EGD);  Surgeon: Gerardo Stockton III, MD;  Location: [facility];  Service: Endoscopy;  Laterality: N/A;    ESOPHAGOGASTRODUODENOSCOPY N/A 8/7/2020    Procedure: ESOPHAGOGASTRODUODENOSCOPY (EGD);  Surgeon: Salud Dale MD;  Location: [facility];  Service: Endoscopy;  Laterality: N/A;       MEDS:  None    NKDA    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    HM:  7/16 TDaP, 6/23 PSA, 12/21 Cscope rep 10y, 7/17 CT Ca zero, 7/22 ETT negative.    Review of Systems   Constitutional:  Negative for appetite change, chills, diaphoresis, fatigue and fever.   HENT:  Negative for congestion, ear pain, rhinorrhea and sinus pressure.    Respiratory:  Negative for cough and shortness of breath.    Cardiovascular:  Negative for chest pain and palpitations.   Gastrointestinal:  Negative for abdominal distention, abdominal pain, blood in stool, constipation, diarrhea, nausea and vomiting.   Genitourinary:  Negative for difficulty urinating, dysuria, frequency, hematuria and urgency.   Musculoskeletal:  Negative for arthralgias.   Skin:  Negative for rash.   Neurological:  Negative for dizziness and headaches.   Psychiatric/Behavioral:  The  "patient is not nervous/anxious.        Objective:   /76 (BP Location: Right arm, Patient Position: Sitting)   Pulse 61   Temp 97.8 °F (36.6 °C) (Oral)   Ht 5' 11" (1.803 m)   Wt 94.8 kg (209 lb)   BMI 29.15 kg/m²     Physical Exam  Constitutional:       Appearance: He is well-developed.   HENT:      Right Ear: External ear normal. Tympanic membrane is not injected.      Left Ear: External ear normal. Tympanic membrane is not injected.   Eyes:      Conjunctiva/sclera: Conjunctivae normal.   Neck:      Thyroid: No thyromegaly.   Cardiovascular:      Rate and Rhythm: Normal rate and regular rhythm.      Heart sounds: No murmur heard.    No friction rub. No gallop.   Pulmonary:      Effort: Pulmonary effort is normal.      Breath sounds: Normal breath sounds. No wheezing or rales.   Abdominal:      General: Bowel sounds are normal.      Palpations: Abdomen is soft. There is no mass.      Tenderness: There is no abdominal tenderness.   Musculoskeletal:      Cervical back: Normal range of motion and neck supple.   Lymphadenopathy:      Cervical: No cervical adenopathy.   Neurological:      Mental Status: He is alert and oriented to person, place, and time.       EKG:  baseline IRBBB.    Assessment:       1. Encounter for preventive health examination    2. Castano's esophagus without dysplasia    3. Left lateral epicondylitis          Plan:     Encounter for preventive health examination- Report results when available.    Castano's esophagus without dysplasia, last EGD 8/20- sched repeat.    Left lateral epicondylitis- ibuprofen 600 TID x 1week, if not better poss Ortho.        "

## 2023-06-28 ENCOUNTER — HOSPITAL ENCOUNTER (OUTPATIENT)
Dept: CARDIOLOGY | Facility: HOSPITAL | Age: 48
Discharge: HOME | End: 2023-06-28
Payer: COMMERCIAL

## 2023-06-28 ENCOUNTER — OFFICE VISIT (OUTPATIENT)
Dept: INTERNAL MEDICINE | Facility: CLINIC | Age: 48
End: 2023-06-28
Payer: COMMERCIAL

## 2023-06-28 ENCOUNTER — CLINICAL SUPPORT (OUTPATIENT)
Dept: INTERNAL MEDICINE | Facility: CLINIC | Age: 48
End: 2023-06-28
Payer: COMMERCIAL

## 2023-06-28 VITALS
HEART RATE: 61 BPM | TEMPERATURE: 98 F | HEIGHT: 71 IN | WEIGHT: 209 LBS | DIASTOLIC BLOOD PRESSURE: 76 MMHG | SYSTOLIC BLOOD PRESSURE: 120 MMHG | BODY MASS INDEX: 29.26 KG/M2

## 2023-06-28 DIAGNOSIS — Z00.00 ROUTINE GENERAL MEDICAL EXAMINATION AT A HEALTH CARE FACILITY: Primary | ICD-10-CM

## 2023-06-28 DIAGNOSIS — M77.12 LEFT LATERAL EPICONDYLITIS: ICD-10-CM

## 2023-06-28 DIAGNOSIS — Z00.00 ROUTINE GENERAL MEDICAL EXAMINATION AT A HEALTH CARE FACILITY: ICD-10-CM

## 2023-06-28 DIAGNOSIS — Z00.00 ENCOUNTER FOR PREVENTIVE HEALTH EXAMINATION: Primary | ICD-10-CM

## 2023-06-28 DIAGNOSIS — K22.70 BARRETT'S ESOPHAGUS WITHOUT DYSPLASIA: Chronic | ICD-10-CM

## 2023-06-28 LAB
ALBUMIN SERPL BCP-MCNC: 4.5 G/DL (ref 3.5–5.2)
ALP SERPL-CCNC: 60 U/L (ref 55–135)
ALT SERPL W/O P-5'-P-CCNC: 30 U/L (ref 10–44)
ANION GAP SERPL CALC-SCNC: 11 MMOL/L (ref 8–16)
AST SERPL-CCNC: 19 U/L (ref 10–40)
BILIRUB SERPL-MCNC: 0.6 MG/DL (ref 0.1–1)
BUN SERPL-MCNC: 20 MG/DL (ref 6–20)
CALCIUM SERPL-MCNC: 9.6 MG/DL (ref 8.7–10.5)
CHLORIDE SERPL-SCNC: 103 MMOL/L (ref 95–110)
CHOLEST SERPL-MCNC: 188 MG/DL (ref 120–199)
CHOLEST/HDLC SERPL: 4.6 {RATIO} (ref 2–5)
CO2 SERPL-SCNC: 25 MMOL/L (ref 23–29)
COMPLEXED PSA SERPL-MCNC: 1.5 NG/ML (ref 0–4)
CREAT SERPL-MCNC: 1.2 MG/DL (ref 0.5–1.4)
ERYTHROCYTE [DISTWIDTH] IN BLOOD BY AUTOMATED COUNT: 12.2 % (ref 11.5–14.5)
EST. GFR  (NO RACE VARIABLE): >60 ML/MIN/1.73 M^2
ESTIMATED AVG GLUCOSE: 105 MG/DL (ref 68–131)
GLUCOSE SERPL-MCNC: 100 MG/DL (ref 70–110)
HBA1C MFR BLD: 5.3 % (ref 4–5.6)
HCT VFR BLD AUTO: 40.8 % (ref 40–54)
HDLC SERPL-MCNC: 41 MG/DL (ref 40–75)
HDLC SERPL: 21.8 % (ref 20–50)
HGB BLD-MCNC: 14 G/DL (ref 14–18)
LDLC SERPL CALC-MCNC: 119.2 MG/DL (ref 63–159)
MCH RBC QN AUTO: 29.4 PG (ref 27–31)
MCHC RBC AUTO-ENTMCNC: 34.3 G/DL (ref 32–36)
MCV RBC AUTO: 86 FL (ref 82–98)
NONHDLC SERPL-MCNC: 147 MG/DL
PLATELET # BLD AUTO: 225 K/UL (ref 150–450)
PMV BLD AUTO: 10.4 FL (ref 9.2–12.9)
POTASSIUM SERPL-SCNC: 4 MMOL/L (ref 3.5–5.1)
PROT SERPL-MCNC: 7.4 G/DL (ref 6–8.4)
RBC # BLD AUTO: 4.76 M/UL (ref 4.6–6.2)
SODIUM SERPL-SCNC: 139 MMOL/L (ref 136–145)
TRIGL SERPL-MCNC: 139 MG/DL (ref 30–150)
TSH SERPL DL<=0.005 MIU/L-ACNC: 1.4 UIU/ML (ref 0.4–4)
WBC # BLD AUTO: 5.66 K/UL (ref 3.9–12.7)

## 2023-06-28 PROCEDURE — 3008F PR BODY MASS INDEX (BMI) DOCUMENTED: ICD-10-PCS | Mod: CPTII,S$GLB,, | Performed by: INTERNAL MEDICINE

## 2023-06-28 PROCEDURE — 93010 ELECTROCARDIOGRAM REPORT: CPT | Mod: ,,, | Performed by: INTERNAL MEDICINE

## 2023-06-28 PROCEDURE — 80061 LIPID PANEL: CPT | Performed by: INTERNAL MEDICINE

## 2023-06-28 PROCEDURE — 93010 EKG 12-LEAD: ICD-10-PCS | Mod: ,,, | Performed by: INTERNAL MEDICINE

## 2023-06-28 PROCEDURE — 3078F PR MOST RECENT DIASTOLIC BLOOD PRESSURE < 80 MM HG: ICD-10-PCS | Mod: CPTII,S$GLB,, | Performed by: INTERNAL MEDICINE

## 2023-06-28 PROCEDURE — 84443 ASSAY THYROID STIM HORMONE: CPT | Performed by: INTERNAL MEDICINE

## 2023-06-28 PROCEDURE — 99396 PR PREVENTIVE VISIT,EST,40-64: ICD-10-PCS | Mod: S$GLB,,, | Performed by: INTERNAL MEDICINE

## 2023-06-28 PROCEDURE — 3074F PR MOST RECENT SYSTOLIC BLOOD PRESSURE < 130 MM HG: ICD-10-PCS | Mod: CPTII,S$GLB,, | Performed by: INTERNAL MEDICINE

## 2023-06-28 PROCEDURE — 99999 PR PBB SHADOW E&M-EST. PATIENT-LVL III: ICD-10-PCS | Mod: PBBFAC,,, | Performed by: INTERNAL MEDICINE

## 2023-06-28 PROCEDURE — 83036 HEMOGLOBIN GLYCOSYLATED A1C: CPT | Performed by: INTERNAL MEDICINE

## 2023-06-28 PROCEDURE — 99999 PR PBB SHADOW E&M-EST. PATIENT-LVL III: CPT | Mod: PBBFAC,,, | Performed by: INTERNAL MEDICINE

## 2023-06-28 PROCEDURE — 3074F SYST BP LT 130 MM HG: CPT | Mod: CPTII,S$GLB,, | Performed by: INTERNAL MEDICINE

## 2023-06-28 PROCEDURE — 84153 ASSAY OF PSA TOTAL: CPT | Performed by: INTERNAL MEDICINE

## 2023-06-28 PROCEDURE — 3078F DIAST BP <80 MM HG: CPT | Mod: CPTII,S$GLB,, | Performed by: INTERNAL MEDICINE

## 2023-06-28 PROCEDURE — 80053 COMPREHEN METABOLIC PANEL: CPT | Performed by: INTERNAL MEDICINE

## 2023-06-28 PROCEDURE — 99396 PREV VISIT EST AGE 40-64: CPT | Mod: S$GLB,,, | Performed by: INTERNAL MEDICINE

## 2023-06-28 PROCEDURE — 85027 COMPLETE CBC AUTOMATED: CPT | Performed by: INTERNAL MEDICINE

## 2023-06-28 PROCEDURE — 3008F BODY MASS INDEX DOCD: CPT | Mod: CPTII,S$GLB,, | Performed by: INTERNAL MEDICINE

## 2023-06-28 PROCEDURE — 93005 ELECTROCARDIOGRAM TRACING: CPT

## 2023-06-28 NOTE — LETTER
"June 28, 2023    Gideon Justin Ribera   [address] [address] [place] [address]             [address]  [address] [facility] [address]  Phone: [phone]  Fax: [phone] Dear Mr. Ribera,    It was a pleasure to see you and perform your Executive Health physical on Wednesday, June 28, 2023.  At the time of your visit you are 48 years old.  You have been exercising about 3 days a week, and report good energy level.  You have had no fevers, chills, sweats, or cough.  You deny any exertional chest pain, shortness of breath, or palpitations.  You report normal bowel and urinary function.      Your past medical history is significant for Castano's esophagus, esophageal reflux, right bundle-branch block.  You take no regular medications, and have no medication allergies.    Your health maintenance includes 07/2016 tetanus diphtheria pertussis vaccination, 06/2023 PSA, 12/2021 colonoscopy with repeat due after 10 years, 07/2017 CT calcium score 0, 07/2022 exercise treadmill stress test negative.     On physical exam height is 5'11" , your weight is 209 pounds,  body mass index is 29.15.Your blood pressure 120/76,  pulse 61,  Your general appearance is well-developed,  with no distress. Your head and neck exam is normal.  Your heart has a regular rate and rhythm with no abnormal sounds.  Your lungs are clear throughout, with normal respiratory effort.  Your abdomen has normal bowel sounds with no masses or enlarged organs noted. Your extremities have strong distal pulses, with no swelling.     Your EKG is showing your baseline incomplete right bundle branch block.  Your blood count is normal, platelets 225 normal.  Your kidney and liver function are normal.  Your fasting glucose 100, normal.  Hemoglobin A1c 5.3%, normal.  PSA 1.5, normal.  TSH 1.404, normal series your total cholesterol is 188, HDL 41, , triglycerides 139.  This is an overall good cholesterol panel at this " time.    In summary you appear to be in overall good general medical condition.  You are up-to-date on all vaccinations and preventive care needed at this time.  We ordered an EGD for follow-up of your Castano's esophagus.  You will likely need to restart your pantoprazole.  We talked about starting ibuprofen 600 mg 3 times a day for the next week or 2, and if your left tennis elbow is not improved we can refer you to the orthopedist for possible injection.  Please continue your regular exercise to continue your vascular protection as you age.    It was a pleasure to see you and perform year executive physical.  If I can be of any further assistance, or if you have any questions or concerns please do not hesitate to let me know.    Yours very truly,      Chayito Taylor MD

## 2023-06-29 ENCOUNTER — HOSPITAL ENCOUNTER (OUTPATIENT)
Dept: PREADMISSION TESTING | Facility: HOSPITAL | Age: 48
Discharge: HOME | End: 2023-06-29
Payer: COMMERCIAL

## 2023-06-29 DIAGNOSIS — K22.70 BARRETT'S ESOPHAGUS WITHOUT DYSPLASIA: Primary | Chronic | ICD-10-CM

## 2023-08-02 ENCOUNTER — ANESTHESIA EVENT (OUTPATIENT)
Dept: ENDOSCOPY | Facility: HOSPITAL | Age: 48
End: 2023-08-02
Payer: COMMERCIAL

## 2023-08-02 NOTE — ANESTHESIA PREPROCEDURE EVALUATION
08/02/2023  Gideon Ribera Jr. is a 48 y.o., male.    Past Medical History:   Diagnosis Date    Castano esophagus     last egd - 2014 looks normal    GERD (gastroesophageal reflux disease)     Right bundle branch block 6/26/2020    Sinus bradycardia 6/26/2020     Past Surgical History:   Procedure Laterality Date    COLONOSCOPY N/A 12/21/2021    Procedure: COLONOSCOPY;  Surgeon: Mateo Shah MD;  Location: [facility];  Service: Endoscopy;  Laterality: N/A;    ESOPHAGOGASTRODUODENOSCOPY N/A 7/27/2018    Procedure: ESOPHAGOGASTRODUODENOSCOPY (EGD);  Surgeon: Gerardo Stockton III, MD;  Location: [facility];  Service: Endoscopy;  Laterality: N/A;    ESOPHAGOGASTRODUODENOSCOPY N/A 8/7/2020    Procedure: ESOPHAGOGASTRODUODENOSCOPY (EGD);  Surgeon: Salud Dale MD;  Location[facility];  Service: Endoscopy;  Laterality: N/A;     Patient Active Problem List   Diagnosis    Castano's esophagus without dysplasia    Right bundle branch block    Sinus bradycardia     Normal sinus rhythm   Incomplete right bundle branch block   Borderline Abnormal ECG   When compared with ECG of 21-JUL-2022 08:32,   Previous ECG has undetermined rhythm, needs review   Confirmed by SHAY QUINTERO, FANTA DIAZ ([id number]) on 6/28/2023       Pre-op Assessment    I have reviewed the Patient Summary Reports.     I have reviewed the Nursing Notes. I have reviewed the NPO Status.   I have reviewed the Medications.     Review of Systems  Anesthesia Hx:  No problems with previous Anesthesia  Denies Family Hx of Anesthesia complications.   Denies Personal Hx of Anesthesia complications.   Social:  Non-Smoker, No Alcohol Use    Hematology/Oncology:  Hematology Normal   Oncology Normal     EENT/Dental:EENT/Dental Normal   Cardiovascular:   Dysrhythmias ECG has been reviewed. Normal sinus rhythm   Incomplete right bundle branch block    Borderline Abnormal ECG   When compared with ECG of 21-JUL-2022 08:32,   Previous ECG has undetermined rhythm, needs review   Confirmed by FANTA DAY MD ([id number]) on 6/28/2023    Pulmonary:  Pulmonary Normal    Hepatic/GI:   GERD Castano's esophagus   Musculoskeletal:  Musculoskeletal Normal    Neurological:  Neurology Normal    Endocrine:  Endocrine Normal BMI 29.1   Dermatological:  Skin Normal    Psych:  Psychiatric Normal           Physical Exam  General: Well nourished, Cooperative, Alert and Oriented    Airway:  Mallampati: II   Mouth Opening: Normal  TM Distance: Normal  Tongue: Normal    Dental:  Intact    Chest/Lungs:  Normal Respiratory Rate        Anesthesia Plan  Type of Anesthesia, risks & benefits discussed:    Anesthesia Type: Gen Natural Airway  Intra-op Monitoring Plan: Standard ASA Monitors  Post Op Pain Control Plan: multimodal analgesia  Induction:  IV  Informed Consent: Informed consent signed with the Patient and all parties understand the risks and agree with anesthesia plan.  All questions answered.   ASA Score: 2  Day of Surgery Review of History & Physical: H&P Update referred to the surgeon/provider.    Ready For Surgery From Anesthesia Perspective.     .

## 2023-08-04 ENCOUNTER — HOSPITAL ENCOUNTER (OUTPATIENT)
Facility: HOSPITAL | Age: 48
Discharge: HOME | End: 2023-08-04
Attending: INTERNAL MEDICINE | Admitting: INTERNAL MEDICINE
Payer: COMMERCIAL

## 2023-08-04 ENCOUNTER — ANESTHESIA (OUTPATIENT)
Dept: ENDOSCOPY | Facility: HOSPITAL | Age: 48
End: 2023-08-04
Payer: COMMERCIAL

## 2023-08-04 VITALS
OXYGEN SATURATION: 100 % | RESPIRATION RATE: 18 BRPM | TEMPERATURE: 97 F | HEIGHT: 71 IN | DIASTOLIC BLOOD PRESSURE: 81 MMHG | BODY MASS INDEX: 29.95 KG/M2 | WEIGHT: 213.94 LBS | HEART RATE: 65 BPM | SYSTOLIC BLOOD PRESSURE: 129 MMHG

## 2023-08-04 DIAGNOSIS — K22.70 BARRETT'S ESOPHAGUS WITHOUT DYSPLASIA: Primary | Chronic | ICD-10-CM

## 2023-08-04 DIAGNOSIS — K22.70 BARRETT'S ESOPHAGUS: ICD-10-CM

## 2023-08-04 PROCEDURE — 63600175 PHARM REV CODE 636 W HCPCS: Performed by: NURSE ANESTHETIST, CERTIFIED REGISTERED

## 2023-08-04 PROCEDURE — D9220A PRA ANESTHESIA: Mod: ANES,,, | Performed by: ANESTHESIOLOGY

## 2023-08-04 PROCEDURE — 27201012 HC FORCEPS, HOT/COLD, DISP: Performed by: INTERNAL MEDICINE

## 2023-08-04 PROCEDURE — 43239 EGD BIOPSY SINGLE/MULTIPLE: CPT | Mod: ,,, | Performed by: INTERNAL MEDICINE

## 2023-08-04 PROCEDURE — D9220A PRA ANESTHESIA: ICD-10-PCS | Mod: CRNA,,, | Performed by: NURSE ANESTHETIST, CERTIFIED REGISTERED

## 2023-08-04 PROCEDURE — D9220A PRA ANESTHESIA: ICD-10-PCS | Mod: ANES,,, | Performed by: ANESTHESIOLOGY

## 2023-08-04 PROCEDURE — 88305 TISSUE EXAM BY PATHOLOGIST: ICD-10-PCS | Mod: 26,,, | Performed by: STUDENT IN AN ORGANIZED HEALTH CARE EDUCATION/TRAINING PROGRAM

## 2023-08-04 PROCEDURE — D9220A PRA ANESTHESIA: Mod: CRNA,,, | Performed by: NURSE ANESTHETIST, CERTIFIED REGISTERED

## 2023-08-04 PROCEDURE — 63600175 PHARM REV CODE 636 W HCPCS: Performed by: INTERNAL MEDICINE

## 2023-08-04 PROCEDURE — 37000008 HC ANESTHESIA 1ST 15 MINUTES: Performed by: INTERNAL MEDICINE

## 2023-08-04 PROCEDURE — 43239 PR EGD, FLEX, W/BIOPSY, SGL/MULTI: ICD-10-PCS | Mod: ,,, | Performed by: INTERNAL MEDICINE

## 2023-08-04 PROCEDURE — 43239 EGD BIOPSY SINGLE/MULTIPLE: CPT | Performed by: INTERNAL MEDICINE

## 2023-08-04 PROCEDURE — 25000003 PHARM REV CODE 250: Performed by: NURSE ANESTHETIST, CERTIFIED REGISTERED

## 2023-08-04 PROCEDURE — 88305 TISSUE EXAM BY PATHOLOGIST: CPT | Performed by: STUDENT IN AN ORGANIZED HEALTH CARE EDUCATION/TRAINING PROGRAM

## 2023-08-04 PROCEDURE — 88305 TISSUE EXAM BY PATHOLOGIST: CPT | Mod: 26,,, | Performed by: STUDENT IN AN ORGANIZED HEALTH CARE EDUCATION/TRAINING PROGRAM

## 2023-08-04 PROCEDURE — 37000009 HC ANESTHESIA EA ADD 15 MINS: Performed by: INTERNAL MEDICINE

## 2023-08-04 RX ORDER — PROPOFOL 10 MG/ML
INJECTION, EMULSION INTRAVENOUS
Status: DISCONTINUED | OUTPATIENT
Start: 2023-08-04 | End: 2023-08-04

## 2023-08-04 RX ORDER — DEXMEDETOMIDINE HYDROCHLORIDE 100 UG/ML
INJECTION, SOLUTION INTRAVENOUS
Status: DISCONTINUED | OUTPATIENT
Start: 2023-08-04 | End: 2023-08-04

## 2023-08-04 RX ORDER — LIDOCAINE HYDROCHLORIDE 20 MG/ML
INJECTION INTRAVENOUS
Status: DISCONTINUED | OUTPATIENT
Start: 2023-08-04 | End: 2023-08-04

## 2023-08-04 RX ORDER — SODIUM CHLORIDE, SODIUM LACTATE, POTASSIUM CHLORIDE, CALCIUM CHLORIDE 600; 310; 30; 20 MG/100ML; MG/100ML; MG/100ML; MG/100ML
INJECTION, SOLUTION INTRAVENOUS CONTINUOUS
Status: DISCONTINUED | OUTPATIENT
Start: 2023-08-04 | End: 2023-08-04 | Stop reason: HOSPADM

## 2023-08-04 RX ADMIN — DEXMEDETOMIDINE HYDROCHLORIDE 4 MCG: 100 INJECTION, SOLUTION INTRAVENOUS at 08:08

## 2023-08-04 RX ADMIN — SODIUM CHLORIDE, SODIUM LACTATE, POTASSIUM CHLORIDE, AND CALCIUM CHLORIDE: 600; 310; 30; 20 INJECTION, SOLUTION INTRAVENOUS at 08:08

## 2023-08-04 RX ADMIN — PROPOFOL 40 MG: 10 INJECTION, EMULSION INTRAVENOUS at 08:08

## 2023-08-04 RX ADMIN — PROPOFOL 100 MG: 10 INJECTION, EMULSION INTRAVENOUS at 08:08

## 2023-08-04 RX ADMIN — LIDOCAINE HYDROCHLORIDE 50 MG: 20 INJECTION INTRAVENOUS at 08:08

## 2023-08-04 NOTE — TRANSFER OF CARE
"Anesthesia Transfer of Care Note    Patient: Gideon Ribera Jr.    Procedure(s) Performed: Procedure(s) (LRB):  EGD (ESOPHAGOGASTRODUODENOSCOPY) (N/A)    Patient location: PACU    Anesthesia Type: general    Transport from OR: Transported from OR on room air with adequate spontaneous ventilation    Post pain: adequate analgesia    Post assessment: no apparent anesthetic complications and tolerated procedure well    Post vital signs: stable    Level of consciousness: awake    Nausea/Vomiting: no nausea/vomiting    Complications: none    Transfer of care protocol was followed      Last vitals:   Visit Vitals  /83 (BP Location: Right arm, Patient Position: Sitting)   Pulse 65   Temp 36.3 °C (97.3 °F) (Temporal)   Resp 17   Ht 5' 11" (1.803 m)   Wt 97 kg (213 lb 15.3 oz)   SpO2 98%   BMI 29.84 kg/m²     "

## 2023-08-04 NOTE — ANESTHESIA POSTPROCEDURE EVALUATION
Anesthesia Post Evaluation    Patient: Gideon Ribera Jr.    Procedure(s) Performed: Procedure(s) (LRB):  EGD (ESOPHAGOGASTRODUODENOSCOPY) (N/A)    Final Anesthesia Type: general      Patient location during evaluation: PACU  Patient participation: Yes- Able to Participate  Level of consciousness: awake  Post-procedure vital signs: reviewed and stable  Pain management: adequate  Airway patency: patent    PONV status at discharge: No PONV  Anesthetic complications: no      Cardiovascular status: stable  Respiratory status: unassisted  Hydration status: euvolemic  Follow-up not needed.          Vitals Value Taken Time   /76 08/04/23 0915   Temp 36.2 °C (97.1 °F) 08/04/23 0905   Pulse 68 08/04/23 0916   Resp 22 08/04/23 0916   SpO2 99 % 08/04/23 0916   Vitals shown include unvalidated device data.      No case tracking events are documented in the log.      Pain/Petey Score: Petey Score: 10 (8/4/2023  9:15 AM)

## 2023-08-04 NOTE — H&P
PRE PROCEDURE H&P    Patient Name: Gideon Ribera Jr.  MRN: [id number]  : 1975  Date of Procedure:  2023  Referring Physician: Chayito Taylor MD  Primary Physician: Josesito Dunn MD  Procedure Physician: Bert Kearns MD       Planned Procedure: EGD  Diagnosis: BE  Chief Complaint: Same as above    HPI: Patient is an 48 y.o. male is here for the above.       Anticoagulation: None     Past Medical History:   Past Medical History:   Diagnosis Date    Castano esophagus     last egd -  looks normal    GERD (gastroesophageal reflux disease)     Right bundle branch block 2020    Sinus bradycardia 2020        Past Surgical History:  Past Surgical History:   Procedure Laterality Date    COLONOSCOPY N/A 2021    Procedure: COLONOSCOPY;  Surgeon: Mateo Shah MD;  Location: [facility];  Service: Endoscopy;  Laterality: N/A;    ESOPHAGOGASTRODUODENOSCOPY N/A 2018    Procedure: ESOPHAGOGASTRODUODENOSCOPY (EGD);  Surgeon: Gerardo Stockton III, MD;  Location: [facility];  Service: Endoscopy;  Laterality: N/A;    ESOPHAGOGASTRODUODENOSCOPY N/A 2020    Procedure: ESOPHAGOGASTRODUODENOSCOPY (EGD);  Surgeon: Salud Dale MD;  Location: [facility];  Service: Endoscopy;  Laterality: N/A;        Home Medications:  Prior to Admission medications    Not on File        Allergies:  Review of patient's allergies indicates:  No Known Allergies     Social History:   Social History     Socioeconomic History    Marital status:     Number of children: 1   Occupational History    Occupation: [employer]     Employer: [employer]     Comment:    Tobacco Use    Smoking status: Never    Smokeless tobacco: Never   Substance and Sexual Activity    Alcohol use: Yes     Alcohol/week: 2.0 standard drinks of alcohol     Types: 2 Cans of beer per week     Comment: once a week    Drug use: No    Sexual activity: Yes     Partners: Female   Social History Narrative     with one  "child and works as an ; [employer]       Family History:  Family History   Problem Relation Age of Onset    Crohn's disease Mother     Cancer Father         mantle cell lymphoma at 60    Diabetes Maternal Grandmother     Diabetes Paternal Grandmother     Diabetes Paternal Grandfather     Prostate cancer Paternal Grandfather     Colon cancer Paternal Grandfather        ROS: No acute cardiac events, no acute respiratory complaints.     Physical Exam (all patients):    /83 (BP Location: Right arm, Patient Position: Sitting)   Pulse 65   Temp 97.3 °F (36.3 °C) (Temporal)   Resp 17   Ht 5' 11" (1.803 m)   Wt 97 kg (213 lb 15.3 oz)   SpO2 98%   BMI 29.84 kg/m²   Lungs: Clear to auscultation bilaterally, respirations unlabored  Heart: Regular rate and rhythm, S1 and S2 normal, no obvious murmurs  Abdomen:         Soft, non-tender, bowel sounds normal, no masses, no organomegaly    Lab Results   Component Value Date    WBC 5.66 06/28/2023    MCV 86 06/28/2023    RDW 12.2 06/28/2023     06/28/2023     06/28/2023    HGBA1C 5.3 06/28/2023    BUN 20 06/28/2023     06/28/2023    K 4.0 06/28/2023     06/28/2023        SEDATION PLAN: per anesthesia      History reviewed, vital signs satisfactory, cardiopulmonary status satisfactory, sedation options, risks and plans have been discussed with the patient  All their questions were answered and the patient agrees to the sedation procedures as planned and the patient is deemed an appropriate candidate for the sedation as planned.    Procedure explained to patient, informed consent obtained and placed in chart.    Bert Kearns  8/4/2023  8:52 AM     "

## 2023-08-04 NOTE — PROVATION PATIENT INSTRUCTIONS
Discharge Summary/Instructions after an Endoscopic Procedure  Patient Name: Gideon Ribera  Patient MRN: [id number]  Patient YOB: 1975 Friday, August 4, 2023  Bert Kearns MD  Dear patient,  As a result of recent federal legislation (The Federal Cures Act), you may   receive lab or pathology results from your procedure in your [facility]   account before your physician is able to contact you. Your physician or   their representative will relay the results to you with their   recommendations at their soonest availability.  Thank you,  RESTRICTIONS:  During your procedure today, you received medications for sedation.  These   medications may affect your judgment, balance and coordination.  Therefore,   for 24 hours, you have the following restrictions:   - DO NOT drive a car, operate machinery, make legal/financial decisions,   sign important papers or drink alcohol.    ACTIVITY:  Today: no heavy lifting, straining or running due to procedural   sedation/anesthesia.  The following day: return to full activity including work.  DIET:  Eat and drink normally unless instructed otherwise.     TREATMENT FOR COMMON SIDE EFFECTS:  - Mild abdominal pain, nausea, belching, bloating or excessive gas:  rest,   eat lightly and use a heating pad.  - Sore Throat: treat with throat lozenges and/or gargle with warm salt   water.  - Because air was used during the procedure, expelling large amounts of air   from your rectum or belching is normal.  - If a bowel prep was taken, you may not have a bowel movement for 1-3 days.    This is normal.  SYMPTOMS TO WATCH FOR AND REPORT TO YOUR PHYSICIAN:  1. Abdominal pain or bloating, other than gas cramps.  2. Chest pain.  3. Back pain.  4. Signs of infection such as: chills or fever occurring within 24 hours   after the procedure.  5. Rectal bleeding, which would show as bright red, maroon, or black stools.   (A tablespoon of blood from the rectum is not serious, especially if    hemorrhoids are present.)  6. Vomiting.  7. Weakness or dizziness.  GO DIRECTLY TO THE NEAREST EMERGENCY ROOM IF YOU HAVE ANY OF THE FOLLOWING:      Difficulty breathing              Chills and/or fever over 101 F   Persistent vomiting and/or vomiting blood   Severe abdominal pain   Severe chest pain   Black, tarry stools   Bleeding- more than one tablespoon   Any other symptom or condition that you feel may need urgent attention  Your doctor recommends these additional instructions:  If any biopsies were taken, your doctors clinic will contact you in 1 to 2   weeks with any results.  - Discharge patient to home.   - Resume previous diet.   - Continue present medications.   - Await pathology results.   - Return to referring physician.  For questions, problems or results please call your physician Bert Kearns MD at Work:  [phone]  If you have any questions about the above instructions, call the GI   department at [phone] or call the endoscopy unit at [phone]   from 7am until 3 pm.  [facility], EMERGENCY ROOM PHONE NUMBER:   [phone]  IF A COMPLICATION OR EMERGENCY SITUATION ARISES AND YOU ARE UNABLE TO REACH   YOUR PHYSICIAN - GO DIRECTLY TO THE EMERGENCY ROOM.  I have read or have had read to me these discharge instructions for my   procedure and have received a written copy.  I understand these   instructions and will follow-up with my physician if I have any questions.     __________________________________       _____________________________________  Nurse Signature                                          Patient/Designated   Responsible Party Signature  Bert Kearns MD  8/4/2023 9:02:04 AM  This report has been verified and signed electronically.  Dear patient,  As a result of recent federal legislation (The Federal Cures Act), you may   receive lab or pathology results from your procedure in your [facility]   account before your physician is  able to contact you. Your physician or   their representative will relay the results to you with their   recommendations at their soonest availability.  Thank you,  PROVATION

## 2023-08-09 LAB
FINAL PATHOLOGIC DIAGNOSIS: NORMAL
Lab: NORMAL

## 2023-08-30 ENCOUNTER — PATIENT MESSAGE (OUTPATIENT)
Dept: GASTROENTEROLOGY | Facility: CLINIC | Age: 48
End: 2023-08-30
Payer: COMMERCIAL

## 2023-09-13 ENCOUNTER — PATIENT MESSAGE (OUTPATIENT)
Dept: GASTROENTEROLOGY | Facility: CLINIC | Age: 48
End: 2023-09-13
Payer: COMMERCIAL

## 2023-10-02 ENCOUNTER — LAB VISIT (OUTPATIENT)
Dept: LAB | Facility: HOSPITAL | Age: 48
End: 2023-10-02
Attending: INTERNAL MEDICINE
Payer: COMMERCIAL

## 2023-10-02 ENCOUNTER — OFFICE VISIT (OUTPATIENT)
Dept: INTERNAL MEDICINE | Facility: CLINIC | Age: 48
End: 2023-10-02
Payer: COMMERCIAL

## 2023-10-02 VITALS
SYSTOLIC BLOOD PRESSURE: 132 MMHG | DIASTOLIC BLOOD PRESSURE: 86 MMHG | HEART RATE: 75 BPM | BODY MASS INDEX: 30.37 KG/M2 | OXYGEN SATURATION: 99 % | HEIGHT: 71 IN | WEIGHT: 216.94 LBS | RESPIRATION RATE: 18 BRPM

## 2023-10-02 DIAGNOSIS — M79.675 GREAT TOE PAIN, LEFT: ICD-10-CM

## 2023-10-02 DIAGNOSIS — M25.571 ACUTE RIGHT ANKLE PAIN: Primary | ICD-10-CM

## 2023-10-02 DIAGNOSIS — M25.571 ACUTE RIGHT ANKLE PAIN: ICD-10-CM

## 2023-10-02 DIAGNOSIS — E66.9 OBESITY (BMI 30.0-34.9): ICD-10-CM

## 2023-10-02 LAB
BASOPHILS # BLD AUTO: 0.05 K/UL (ref 0–0.2)
BASOPHILS NFR BLD: 0.8 % (ref 0–1.9)
DIFFERENTIAL METHOD: ABNORMAL
EOSINOPHIL # BLD AUTO: 0.2 K/UL (ref 0–0.5)
EOSINOPHIL NFR BLD: 2.8 % (ref 0–8)
ERYTHROCYTE [DISTWIDTH] IN BLOOD BY AUTOMATED COUNT: 12.3 % (ref 11.5–14.5)
HCT VFR BLD AUTO: 40.2 % (ref 40–54)
HGB BLD-MCNC: 13.7 G/DL (ref 14–18)
IMM GRANULOCYTES # BLD AUTO: 0.02 K/UL (ref 0–0.04)
IMM GRANULOCYTES NFR BLD AUTO: 0.3 % (ref 0–0.5)
LYMPHOCYTES # BLD AUTO: 2 K/UL (ref 1–4.8)
LYMPHOCYTES NFR BLD: 33 % (ref 18–48)
MCH RBC QN AUTO: 29.1 PG (ref 27–31)
MCHC RBC AUTO-ENTMCNC: 34.1 G/DL (ref 32–36)
MCV RBC AUTO: 86 FL (ref 82–98)
MONOCYTES # BLD AUTO: 0.5 K/UL (ref 0.3–1)
MONOCYTES NFR BLD: 9 % (ref 4–15)
NEUTROPHILS # BLD AUTO: 3.3 K/UL (ref 1.8–7.7)
NEUTROPHILS NFR BLD: 54.1 % (ref 38–73)
NRBC BLD-RTO: 0 /100 WBC
PLATELET # BLD AUTO: 283 K/UL (ref 150–450)
PMV BLD AUTO: 10.5 FL (ref 9.2–12.9)
RBC # BLD AUTO: 4.7 M/UL (ref 4.6–6.2)
URATE SERPL-MCNC: 8.6 MG/DL (ref 3.4–7)
WBC # BLD AUTO: 6.03 K/UL (ref 3.9–12.7)

## 2023-10-02 PROCEDURE — 84550 ASSAY OF BLOOD/URIC ACID: CPT | Performed by: FAMILY MEDICINE

## 2023-10-02 PROCEDURE — 3008F BODY MASS INDEX DOCD: CPT | Mod: CPTII,S$GLB,, | Performed by: FAMILY MEDICINE

## 2023-10-02 PROCEDURE — 3044F HG A1C LEVEL LT 7.0%: CPT | Mod: CPTII,S$GLB,, | Performed by: FAMILY MEDICINE

## 2023-10-02 PROCEDURE — 3075F PR MOST RECENT SYSTOLIC BLOOD PRESS GE 130-139MM HG: ICD-10-PCS | Mod: CPTII,S$GLB,, | Performed by: FAMILY MEDICINE

## 2023-10-02 PROCEDURE — 85025 COMPLETE CBC W/AUTO DIFF WBC: CPT | Performed by: FAMILY MEDICINE

## 2023-10-02 PROCEDURE — 99999 PR PBB SHADOW E&M-EST. PATIENT-LVL IV: CPT | Mod: PBBFAC,,, | Performed by: FAMILY MEDICINE

## 2023-10-02 PROCEDURE — 3079F PR MOST RECENT DIASTOLIC BLOOD PRESSURE 80-89 MM HG: ICD-10-PCS | Mod: CPTII,S$GLB,, | Performed by: FAMILY MEDICINE

## 2023-10-02 PROCEDURE — 1159F MED LIST DOCD IN RCRD: CPT | Mod: CPTII,S$GLB,, | Performed by: FAMILY MEDICINE

## 2023-10-02 PROCEDURE — 3008F PR BODY MASS INDEX (BMI) DOCUMENTED: ICD-10-PCS | Mod: CPTII,S$GLB,, | Performed by: FAMILY MEDICINE

## 2023-10-02 PROCEDURE — 36415 COLL VENOUS BLD VENIPUNCTURE: CPT | Performed by: FAMILY MEDICINE

## 2023-10-02 PROCEDURE — 3079F DIAST BP 80-89 MM HG: CPT | Mod: CPTII,S$GLB,, | Performed by: FAMILY MEDICINE

## 2023-10-02 PROCEDURE — 1160F PR REVIEW ALL MEDS BY PRESCRIBER/CLIN PHARMACIST DOCUMENTED: ICD-10-PCS | Mod: CPTII,S$GLB,, | Performed by: FAMILY MEDICINE

## 2023-10-02 PROCEDURE — 1159F PR MEDICATION LIST DOCUMENTED IN MEDICAL RECORD: ICD-10-PCS | Mod: CPTII,S$GLB,, | Performed by: FAMILY MEDICINE

## 2023-10-02 PROCEDURE — 3075F SYST BP GE 130 - 139MM HG: CPT | Mod: CPTII,S$GLB,, | Performed by: FAMILY MEDICINE

## 2023-10-02 PROCEDURE — 99214 OFFICE O/P EST MOD 30 MIN: CPT | Mod: S$GLB,,, | Performed by: FAMILY MEDICINE

## 2023-10-02 PROCEDURE — 99999 PR PBB SHADOW E&M-EST. PATIENT-LVL IV: ICD-10-PCS | Mod: PBBFAC,,, | Performed by: FAMILY MEDICINE

## 2023-10-02 PROCEDURE — 3044F PR MOST RECENT HEMOGLOBIN A1C LEVEL <7.0%: ICD-10-PCS | Mod: CPTII,S$GLB,, | Performed by: FAMILY MEDICINE

## 2023-10-02 PROCEDURE — 99214 PR OFFICE/OUTPT VISIT, EST, LEVL IV, 30-39 MIN: ICD-10-PCS | Mod: S$GLB,,, | Performed by: FAMILY MEDICINE

## 2023-10-02 PROCEDURE — 1160F RVW MEDS BY RX/DR IN RCRD: CPT | Mod: CPTII,S$GLB,, | Performed by: FAMILY MEDICINE

## 2023-10-02 RX ORDER — METHYLPREDNISOLONE 4 MG/1
TABLET ORAL
Qty: 1 EACH | Refills: 0 | Status: SHIPPED | OUTPATIENT
Start: 2023-10-02

## 2023-10-02 NOTE — PROGRESS NOTES
"Subjective:       Patient ID: Gideon Ribera Jr. is a 48 y.o. male.    Chief Complaint: Joint Swelling (Right Ankle) and Toe Pain (Left)    48-year-old male patient Dr. Dunn with Patient Active Problem List:     Castano's esophagus without dysplasia     Right bundle branch block     Sinus bradycardia  Here with complaint of right ankle pain off and on for the past few days and started having left great toe pain since weekend, does not recall having any injury or trauma and has been not favoring on his left foot due to pain, has been taking over-the-counter ibuprofen 400 mg 1-2 tablets as needed        Review of Systems   Constitutional:  Negative for appetite change, chills, fatigue and fever.   Eyes:  Negative for visual disturbance.   Respiratory:  Negative for shortness of breath.    Cardiovascular:  Negative for chest pain, palpitations and leg swelling.   Gastrointestinal:  Negative for abdominal pain, nausea and vomiting.   Musculoskeletal:  Positive for arthralgias and myalgias.   Skin:  Negative for color change and rash.   Neurological:  Negative for weakness, numbness and headaches.   Psychiatric/Behavioral:  Negative for sleep disturbance.          /86 (BP Location: Left arm, Patient Position: Sitting, BP Method: Large (Manual))   Pulse 75   Resp 18   Ht 5' 11" (1.803 m)   Wt 98.4 kg (216 lb 14.9 oz)   SpO2 99%   BMI 30.26 kg/m²   Objective:      Physical Exam  Constitutional:       Appearance: He is well-developed.   HENT:      Head: Normocephalic and atraumatic.   Cardiovascular:      Rate and Rhythm: Normal rate and regular rhythm.      Heart sounds: Normal heart sounds. No murmur heard.  Pulmonary:      Effort: Pulmonary effort is normal.      Breath sounds: Normal breath sounds. No wheezing.   Abdominal:      General: Bowel sounds are normal.      Palpations: Abdomen is soft.      Tenderness: There is no abdominal tenderness.   Musculoskeletal:         General: Tenderness present.     "  Comments: Positive for tenderness at the base of the left MTP joint  No significant tenderness noted to the right ankle on exam today   Skin:     General: Skin is warm and dry.      Findings: No erythema or rash.   Neurological:      Mental Status: He is alert and oriented to person, place, and time.   Psychiatric:         Mood and Affect: Mood normal.           Assessment/Plan:   1. Acute right ankle pain  -     Uric Acid; Future; Expected date: 10/02/2023  -     CBC Auto Differential; Future; Expected date: 10/02/2023  -     methylPREDNISolone (MEDROL DOSEPACK) 4 mg tablet; use as directed  Dispense: 1 each; Refill: 0  2. Great toe pain, left  -     Uric Acid; Future; Expected date: 10/02/2023  -     CBC Auto Differential; Future; Expected date: 10/02/2023  -     methylPREDNISolone (MEDROL DOSEPACK) 4 mg tablet; use as directed  Dispense: 1 each; Refill: 0    Will get uric acid levels to rule out acute gout  Medrol Dosepak prescribed today for symptomatic relief  Patient was advised to give enough rest, ice and wear comfortable shoes    3. Obesity (BMI 30.0-34.9)  Lifestyle modifications discussed

## 2023-10-04 ENCOUNTER — PATIENT MESSAGE (OUTPATIENT)
Dept: INTERNAL MEDICINE | Facility: CLINIC | Age: 48
End: 2023-10-04
Payer: COMMERCIAL

## 2023-10-25 ENCOUNTER — OFFICE VISIT (OUTPATIENT)
Dept: INTERNAL MEDICINE | Facility: CLINIC | Age: 48
End: 2023-10-25
Payer: COMMERCIAL

## 2023-10-25 VITALS
HEART RATE: 68 BPM | DIASTOLIC BLOOD PRESSURE: 82 MMHG | WEIGHT: 215.19 LBS | TEMPERATURE: 97 F | BODY MASS INDEX: 30.13 KG/M2 | OXYGEN SATURATION: 100 % | SYSTOLIC BLOOD PRESSURE: 128 MMHG | HEIGHT: 71 IN

## 2023-10-25 DIAGNOSIS — M79.675 GREAT TOE PAIN, LEFT: ICD-10-CM

## 2023-10-25 DIAGNOSIS — M25.571 ACUTE RIGHT ANKLE PAIN: ICD-10-CM

## 2023-10-25 PROCEDURE — 3008F BODY MASS INDEX DOCD: CPT | Mod: CPTII,S$GLB,, | Performed by: NURSE PRACTITIONER

## 2023-10-25 PROCEDURE — 3079F PR MOST RECENT DIASTOLIC BLOOD PRESSURE 80-89 MM HG: ICD-10-PCS | Mod: CPTII,S$GLB,, | Performed by: NURSE PRACTITIONER

## 2023-10-25 PROCEDURE — 1159F PR MEDICATION LIST DOCUMENTED IN MEDICAL RECORD: ICD-10-PCS | Mod: CPTII,S$GLB,, | Performed by: NURSE PRACTITIONER

## 2023-10-25 PROCEDURE — 3074F SYST BP LT 130 MM HG: CPT | Mod: CPTII,S$GLB,, | Performed by: NURSE PRACTITIONER

## 2023-10-25 PROCEDURE — 99999 PR PBB SHADOW E&M-EST. PATIENT-LVL IV: CPT | Mod: PBBFAC,,, | Performed by: NURSE PRACTITIONER

## 2023-10-25 PROCEDURE — 3044F HG A1C LEVEL LT 7.0%: CPT | Mod: CPTII,S$GLB,, | Performed by: NURSE PRACTITIONER

## 2023-10-25 PROCEDURE — 3008F PR BODY MASS INDEX (BMI) DOCUMENTED: ICD-10-PCS | Mod: CPTII,S$GLB,, | Performed by: NURSE PRACTITIONER

## 2023-10-25 PROCEDURE — 3079F DIAST BP 80-89 MM HG: CPT | Mod: CPTII,S$GLB,, | Performed by: NURSE PRACTITIONER

## 2023-10-25 PROCEDURE — 3074F PR MOST RECENT SYSTOLIC BLOOD PRESSURE < 130 MM HG: ICD-10-PCS | Mod: CPTII,S$GLB,, | Performed by: NURSE PRACTITIONER

## 2023-10-25 PROCEDURE — 1159F MED LIST DOCD IN RCRD: CPT | Mod: CPTII,S$GLB,, | Performed by: NURSE PRACTITIONER

## 2023-10-25 PROCEDURE — 99999 PR PBB SHADOW E&M-EST. PATIENT-LVL IV: ICD-10-PCS | Mod: PBBFAC,,, | Performed by: NURSE PRACTITIONER

## 2023-10-25 PROCEDURE — 99213 OFFICE O/P EST LOW 20 MIN: CPT | Mod: S$GLB,,, | Performed by: NURSE PRACTITIONER

## 2023-10-25 PROCEDURE — 3044F PR MOST RECENT HEMOGLOBIN A1C LEVEL <7.0%: ICD-10-PCS | Mod: CPTII,S$GLB,, | Performed by: NURSE PRACTITIONER

## 2023-10-25 PROCEDURE — 99213 PR OFFICE/OUTPT VISIT, EST, LEVL III, 20-29 MIN: ICD-10-PCS | Mod: S$GLB,,, | Performed by: NURSE PRACTITIONER

## 2023-10-25 RX ORDER — COLCHICINE 0.6 MG/1
0.6 TABLET ORAL DAILY
Qty: 7 TABLET | Refills: 0 | Status: SHIPPED | OUTPATIENT
Start: 2023-10-25 | End: 2024-01-04 | Stop reason: SDUPTHER

## 2023-10-25 NOTE — PROGRESS NOTES
Subjective:       Patient ID: Gideon Ribera Jr. is a 48 y.o. male.    Chief Complaint: Ankle Pain (Knee, left toe patient states that it is a gout flare up has had previous lab work)    HPI  Recently txed for gout earlier this month with steroid pack. Some pain still present      Past Medical History:   Diagnosis Date    Castano esophagus     last egd - 2014 looks normal    GERD (gastroesophageal reflux disease)     Right bundle branch block 6/26/2020    Sinus bradycardia 6/26/2020     Past Surgical History:   Procedure Laterality Date    COLONOSCOPY N/A 12/21/2021    Procedure: COLONOSCOPY;  Surgeon: Mateo Shah MD;  Location: [facility];  Service: Endoscopy;  Laterality: N/A;    ESOPHAGOGASTRODUODENOSCOPY N/A 7/27/2018    Procedure: ESOPHAGOGASTRODUODENOSCOPY (EGD);  Surgeon: Gerardo Stockton III, MD;  Location: [facility];  Service: Endoscopy;  Laterality: N/A;    ESOPHAGOGASTRODUODENOSCOPY N/A 8/7/2020    Procedure: ESOPHAGOGASTRODUODENOSCOPY (EGD);  Surgeon: Salud Dale MD;  Location: [facility];  Service: Endoscopy;  Laterality: N/A;    ESOPHAGOGASTRODUODENOSCOPY N/A 8/4/2023    Procedure: EGD (ESOPHAGOGASTRODUODENOSCOPY);  Surgeon: Bert Kearns MD;  Location: [facility];  Service: Gastroenterology;  Laterality: N/A;     Social History     Socioeconomic History    Marital status:     Number of children: 1   Occupational History    Occupation: shell     Employer: [employer]     Comment:    Tobacco Use    Smoking status: Never    Smokeless tobacco: Never   Substance and Sexual Activity    Alcohol use: Yes     Alcohol/week: 2.0 standard drinks of alcohol     Types: 2 Cans of beer per week     Comment: once a week    Drug use: No    Sexual activity: Yes     Partners: Female   Social History Narrative     with one child and works as an [employer]; shell     Review of patient's allergies indicates:  No Known Allergies  Current Outpatient Medications   Medication Sig     colchicine, gout, (COLCRYS) 0.6 mg tablet Take 1 tablet (0.6 mg total) by mouth once daily. for 7 days    methylPREDNISolone (MEDROL DOSEPACK) 4 mg tablet use as directed (Patient not taking: Reported on 10/25/2023)     No current facility-administered medications for this visit.           Review of Systems   Constitutional:  Negative for activity change, appetite change, chills, diaphoresis, fatigue, fever and unexpected weight change.   HENT:  Negative for congestion, ear pain, postnasal drip, rhinorrhea, sinus pressure, sinus pain, sneezing, sore throat, tinnitus, trouble swallowing and voice change.    Eyes:  Negative for photophobia, pain and visual disturbance.   Respiratory:  Negative for cough, chest tightness, shortness of breath and wheezing.    Cardiovascular:  Negative for chest pain, palpitations and leg swelling.   Gastrointestinal:  Negative for abdominal distention, abdominal pain, constipation, diarrhea, nausea and vomiting.   Genitourinary:  Negative for decreased urine volume, difficulty urinating, dysuria, flank pain, frequency, hematuria and urgency.   Musculoskeletal:  Positive for arthralgias and myalgias. Negative for back pain, joint swelling, neck pain and neck stiffness.   Allergic/Immunologic: Negative for immunocompromised state.   Neurological:  Negative for dizziness, tremors, seizures, syncope, facial asymmetry, speech difficulty, weakness, light-headedness, numbness and headaches.   Hematological:  Negative for adenopathy. Does not bruise/bleed easily.   Psychiatric/Behavioral:  Negative for confusion and sleep disturbance.        Objective:      Physical Exam  Musculoskeletal:      Right ankle: Tenderness present.         Assessment:     Vitals:    10/25/23 1531   BP: 128/82   Pulse: 68   Temp: 96.7 °F (35.9 °C)         1. Acute right ankle pain    2. Great toe pain, left        Plan:   Acute right ankle pain    Great toe pain, left    Other orders  -     colchicine, gout,  (COLCRYS) 0.6 mg tablet; Take 1 tablet (0.6 mg total) by mouth once daily. for 7 days  Dispense: 7 tablet; Refill: 0      As above  Rest, ice, elevation  Purine diet given on AVS  F/u pcp

## 2023-12-13 ENCOUNTER — IMMUNIZATION (OUTPATIENT)
Dept: PRIMARY CARE CLINIC | Facility: CLINIC | Age: 48
End: 2023-12-13
Payer: COMMERCIAL

## 2023-12-13 DIAGNOSIS — Z23 NEED FOR VACCINATION: Primary | ICD-10-CM

## 2023-12-13 PROCEDURE — 90686 IIV4 VACC NO PRSV 0.5 ML IM: CPT | Mod: S$GLB,,, | Performed by: FAMILY MEDICINE

## 2023-12-13 PROCEDURE — G0008 ADMIN INFLUENZA VIRUS VAC: HCPCS | Mod: S$GLB,,, | Performed by: FAMILY MEDICINE

## 2023-12-13 PROCEDURE — G0008 FLU VACCINE (QUAD) GREATER THAN OR EQUAL TO 3YO PRESERVATIVE FREE IM: ICD-10-PCS | Mod: S$GLB,,, | Performed by: FAMILY MEDICINE

## 2023-12-13 PROCEDURE — 90686 FLU VACCINE (QUAD) GREATER THAN OR EQUAL TO 3YO PRESERVATIVE FREE IM: ICD-10-PCS | Mod: S$GLB,,, | Performed by: FAMILY MEDICINE

## 2024-01-09 RX ORDER — COLCHICINE 0.6 MG/1
0.6 TABLET ORAL DAILY
Qty: 7 TABLET | Refills: 0 | Status: SHIPPED | OUTPATIENT
Start: 2024-01-09 | End: 2024-01-16

## 2024-04-05 ENCOUNTER — OFFICE VISIT (OUTPATIENT)
Dept: URGENT CARE | Facility: CLINIC | Age: 49
End: 2024-04-05
Payer: COMMERCIAL

## 2024-04-05 VITALS
BODY MASS INDEX: 29.4 KG/M2 | OXYGEN SATURATION: 100 % | WEIGHT: 210 LBS | SYSTOLIC BLOOD PRESSURE: 137 MMHG | HEIGHT: 71 IN | RESPIRATION RATE: 19 BRPM | HEART RATE: 77 BPM | TEMPERATURE: 98 F | DIASTOLIC BLOOD PRESSURE: 81 MMHG

## 2024-04-05 DIAGNOSIS — R50.9 FEVER, UNSPECIFIED FEVER CAUSE: Primary | ICD-10-CM

## 2024-04-05 LAB
CTP QC/QA: YES
CTP QC/QA: YES
POC MOLECULAR INFLUENZA A AGN: NEGATIVE
POC MOLECULAR INFLUENZA B AGN: NEGATIVE
SARS-COV-2 AG RESP QL IA.RAPID: NEGATIVE

## 2024-04-05 PROCEDURE — 99203 OFFICE O/P NEW LOW 30 MIN: CPT | Mod: S$GLB,,, | Performed by: PHYSICIAN ASSISTANT

## 2024-04-05 PROCEDURE — 87502 INFLUENZA DNA AMP PROBE: CPT | Mod: QW,S$GLB,, | Performed by: PHYSICIAN ASSISTANT

## 2024-04-05 PROCEDURE — 87811 SARS-COV-2 COVID19 W/OPTIC: CPT | Mod: QW,S$GLB,, | Performed by: PHYSICIAN ASSISTANT

## 2024-04-05 NOTE — PROGRESS NOTES
"Subjective:      Patient ID: Gideon Ribera Jr. is a 49 y.o. male.    Vitals:  height is 5' 11" (1.803 m) and weight is 95.3 kg (210 lb). His oral temperature is 98.2 °F (36.8 °C). His blood pressure is 137/81 and his pulse is 77. His respiration is 19 and oxygen saturation is 100%.     Chief Complaint: Fever (Poss fever unknown temp)       49 year old patient presents here today with c/o of poss fever (unknown) and night sweats  x 4 days . Pt states he tried otc  ibuprofen and fluids for sxs. Pt denies any sob/n/d . Pt states his fever gets worse at night . Pt denies any sick contact .      Fever   This is a new problem. The current episode started in the past 7 days. The problem occurs constantly. The problem has been unchanged. His temperature was unmeasured prior to arrival. The temperature was taken using an oral thermometer. Pertinent negatives include no abdominal pain, chest pain, congestion, coughing, diarrhea, ear pain, headaches, muscle aches, nausea, rash, sleepiness, sore throat, urinary pain, vomiting or wheezing. He has tried NSAIDs and fluids for the symptoms. The treatment provided no relief.   Risk factors: no sick contacts        Constitution: Positive for fever.   HENT:  Negative for ear pain, congestion and sore throat.    Cardiovascular:  Negative for chest pain.   Respiratory:  Negative for cough and wheezing.    Gastrointestinal:  Negative for abdominal pain, nausea, vomiting and diarrhea.   Genitourinary:  Negative for dysuria.   Skin:  Negative for rash.   Neurological:  Negative for headaches.      Objective:     Physical Exam   Constitutional:  Non-toxic appearance. He does not appear ill. No distress.   HENT:   Head: Normocephalic.   Ears:   Right Ear: Tympanic membrane normal.   Left Ear: Tympanic membrane normal.   Nose: No rhinorrhea or congestion.   Mouth/Throat: Mucous membranes are moist. No oropharyngeal exudate or posterior oropharyngeal erythema.   Cardiovascular: Normal rate " and normal pulses.   No murmur heard.Exam reveals no gallop.   Pulmonary/Chest: Effort normal. No stridor. No respiratory distress. He has no wheezes. He has no rhonchi. He has no rales. He exhibits no tenderness.   Abdominal: Normal appearance.   Neurological: He is alert.   Skin: Skin is not diaphoretic. Capillary refill takes less than 2 seconds. jaundice  Nursing note and vitals reviewed.      Assessment:     1. Fever, unspecified fever cause        Here with fever every evening for the last week. He denies congestion, cough, sore throat, chest pain, shortness of breath, abdominal pain, back pain, diarrhea or dysuria. He feels better after taking ibuprofen. He denies any medication changes. He does not take any prescription drugs and does not drink alcohol daily. He is not withdrawing from anything. He was tested for covid and the flu and both were negative. He was instructed to rest and hydrate and follow up with his PCP next week or return here if new or worsening symptoms occur.     Plan:       Fever, unspecified fever cause  -     SARS Coronavirus 2 Antigen, POCT Manual Read  -     POCT Influenza A/B MOLECULAR

## 2024-04-12 ENCOUNTER — LAB VISIT (OUTPATIENT)
Dept: LAB | Facility: HOSPITAL | Age: 49
End: 2024-04-12
Payer: COMMERCIAL

## 2024-04-12 ENCOUNTER — OFFICE VISIT (OUTPATIENT)
Dept: INTERNAL MEDICINE | Facility: CLINIC | Age: 49
End: 2024-04-12
Payer: COMMERCIAL

## 2024-04-12 ENCOUNTER — LAB VISIT (OUTPATIENT)
Dept: LAB | Facility: HOSPITAL | Age: 49
End: 2024-04-12
Attending: NURSE PRACTITIONER
Payer: COMMERCIAL

## 2024-04-12 VITALS
HEIGHT: 71 IN | RESPIRATION RATE: 17 BRPM | HEART RATE: 70 BPM | TEMPERATURE: 96 F | WEIGHT: 214.5 LBS | BODY MASS INDEX: 30.03 KG/M2 | DIASTOLIC BLOOD PRESSURE: 82 MMHG | SYSTOLIC BLOOD PRESSURE: 130 MMHG | OXYGEN SATURATION: 99 %

## 2024-04-12 DIAGNOSIS — Z09 FOLLOW-UP EXAM: Primary | ICD-10-CM

## 2024-04-12 DIAGNOSIS — R50.9 FEVER, UNSPECIFIED FEVER CAUSE: ICD-10-CM

## 2024-04-12 LAB
ALBUMIN SERPL BCP-MCNC: 4.4 G/DL (ref 3.5–5.2)
ALP SERPL-CCNC: 73 U/L (ref 55–135)
ALT SERPL W/O P-5'-P-CCNC: 29 U/L (ref 10–44)
ANION GAP SERPL CALC-SCNC: 9 MMOL/L (ref 8–16)
AST SERPL-CCNC: 22 U/L (ref 10–40)
BASOPHILS # BLD AUTO: 0.06 K/UL (ref 0–0.2)
BASOPHILS NFR BLD: 1 % (ref 0–1.9)
BILIRUB SERPL-MCNC: 0.4 MG/DL (ref 0.1–1)
BILIRUB UR QL STRIP: NEGATIVE
BUN SERPL-MCNC: 15 MG/DL (ref 6–20)
CALCIUM SERPL-MCNC: 9.4 MG/DL (ref 8.7–10.5)
CHLORIDE SERPL-SCNC: 103 MMOL/L (ref 95–110)
CLARITY UR: CLEAR
CO2 SERPL-SCNC: 26 MMOL/L (ref 23–29)
COLOR UR: YELLOW
CREAT SERPL-MCNC: 1.2 MG/DL (ref 0.5–1.4)
DIFFERENTIAL METHOD BLD: ABNORMAL
EOSINOPHIL # BLD AUTO: 0.2 K/UL (ref 0–0.5)
EOSINOPHIL NFR BLD: 2.4 % (ref 0–8)
ERYTHROCYTE [DISTWIDTH] IN BLOOD BY AUTOMATED COUNT: 12.1 % (ref 11.5–14.5)
EST. GFR  (NO RACE VARIABLE): >60 ML/MIN/1.73 M^2
GLUCOSE SERPL-MCNC: 102 MG/DL (ref 70–110)
GLUCOSE UR QL STRIP: NEGATIVE
HCT VFR BLD AUTO: 42.7 % (ref 40–54)
HGB BLD-MCNC: 14.6 G/DL (ref 14–18)
HGB UR QL STRIP: NEGATIVE
IMM GRANULOCYTES # BLD AUTO: 0.11 K/UL (ref 0–0.04)
IMM GRANULOCYTES NFR BLD AUTO: 1.7 % (ref 0–0.5)
KETONES UR QL STRIP: NEGATIVE
LEUKOCYTE ESTERASE UR QL STRIP: NEGATIVE
LYMPHOCYTES # BLD AUTO: 2.3 K/UL (ref 1–4.8)
LYMPHOCYTES NFR BLD: 35.9 % (ref 18–48)
MCH RBC QN AUTO: 30 PG (ref 27–31)
MCHC RBC AUTO-ENTMCNC: 34.2 G/DL (ref 32–36)
MCV RBC AUTO: 88 FL (ref 82–98)
MONOCYTES # BLD AUTO: 0.5 K/UL (ref 0.3–1)
MONOCYTES NFR BLD: 8.3 % (ref 4–15)
NEUTROPHILS # BLD AUTO: 3.2 K/UL (ref 1.8–7.7)
NEUTROPHILS NFR BLD: 50.7 % (ref 38–73)
NITRITE UR QL STRIP: NEGATIVE
NRBC BLD-RTO: 0 /100 WBC
PH UR STRIP: 7 [PH] (ref 5–8)
PLATELET # BLD AUTO: 324 K/UL (ref 150–450)
PMV BLD AUTO: 9.9 FL (ref 9.2–12.9)
POTASSIUM SERPL-SCNC: 4.9 MMOL/L (ref 3.5–5.1)
PROT SERPL-MCNC: 7.5 G/DL (ref 6–8.4)
PROT UR QL STRIP: NEGATIVE
RBC # BLD AUTO: 4.87 M/UL (ref 4.6–6.2)
SODIUM SERPL-SCNC: 138 MMOL/L (ref 136–145)
SP GR UR STRIP: 1.01 (ref 1–1.03)
URN SPEC COLLECT METH UR: NORMAL
WBC # BLD AUTO: 6.3 K/UL (ref 3.9–12.7)

## 2024-04-12 PROCEDURE — 1159F MED LIST DOCD IN RCRD: CPT | Mod: CPTII,S$GLB,, | Performed by: NURSE PRACTITIONER

## 2024-04-12 PROCEDURE — 80053 COMPREHEN METABOLIC PANEL: CPT | Performed by: NURSE PRACTITIONER

## 2024-04-12 PROCEDURE — 36415 COLL VENOUS BLD VENIPUNCTURE: CPT | Performed by: NURSE PRACTITIONER

## 2024-04-12 PROCEDURE — 81003 URINALYSIS AUTO W/O SCOPE: CPT | Performed by: NURSE PRACTITIONER

## 2024-04-12 PROCEDURE — 3008F BODY MASS INDEX DOCD: CPT | Mod: CPTII,S$GLB,, | Performed by: NURSE PRACTITIONER

## 2024-04-12 PROCEDURE — 85025 COMPLETE CBC W/AUTO DIFF WBC: CPT | Performed by: NURSE PRACTITIONER

## 2024-04-12 PROCEDURE — 3075F SYST BP GE 130 - 139MM HG: CPT | Mod: CPTII,S$GLB,, | Performed by: NURSE PRACTITIONER

## 2024-04-12 PROCEDURE — 99999 PR PBB SHADOW E&M-EST. PATIENT-LVL IV: CPT | Mod: PBBFAC,,, | Performed by: NURSE PRACTITIONER

## 2024-04-12 PROCEDURE — 3079F DIAST BP 80-89 MM HG: CPT | Mod: CPTII,S$GLB,, | Performed by: NURSE PRACTITIONER

## 2024-04-12 PROCEDURE — 87086 URINE CULTURE/COLONY COUNT: CPT | Performed by: NURSE PRACTITIONER

## 2024-04-12 PROCEDURE — 99213 OFFICE O/P EST LOW 20 MIN: CPT | Mod: S$GLB,,, | Performed by: NURSE PRACTITIONER

## 2024-04-12 NOTE — PROGRESS NOTES
Subjective:       Patient ID: Gideon Ribera Jr. is a 49 y.o. male.    Chief Complaint: Follow-up    HPI    Pt following up from urgent care last week  Had low grade temp. Neg covid and flu test  This week no symptoms but would like labs as precaution.    Past Medical History:   Diagnosis Date    Castano esophagus     last egd - 2014 looks normal    GERD (gastroesophageal reflux disease)     Right bundle branch block 6/26/2020    Sinus bradycardia 6/26/2020     Past Surgical History:   Procedure Laterality Date    COLONOSCOPY N/A 12/21/2021    Procedure: COLONOSCOPY;  Surgeon: Mateo Shah MD;  Location: [facility];  Service: Endoscopy;  Laterality: N/A;    ESOPHAGOGASTRODUODENOSCOPY N/A 7/27/2018    Procedure: ESOPHAGOGASTRODUODENOSCOPY (EGD);  Surgeon: Gerardo Stockton III, MD;  Location: [facility];  Service: Endoscopy;  Laterality: N/A;    ESOPHAGOGASTRODUODENOSCOPY N/A 8/7/2020    Procedure: ESOPHAGOGASTRODUODENOSCOPY (EGD);  Surgeon: Salud Dale MD;  Location: [facility];  Service: Endoscopy;  Laterality: N/A;    ESOPHAGOGASTRODUODENOSCOPY N/A 8/4/2023    Procedure: EGD (ESOPHAGOGASTRODUODENOSCOPY);  Surgeon: Bert Kearns MD;  Location: [facility];  Service: Gastroenterology;  Laterality: N/A;     Social History     Socioeconomic History    Marital status:     Number of children: 1   Occupational History    Occupation: shell     Employer: [employer]     Comment:    Tobacco Use    Smoking status: Never     Passive exposure: Never    Smokeless tobacco: Never   Substance and Sexual Activity    Alcohol use: Yes     Alcohol/week: 2.0 standard drinks of alcohol     Types: 2 Cans of beer per week     Comment: once a week    Drug use: No    Sexual activity: Yes     Partners: Female   Social History Narrative     with one child and works as an [employer]; shell     Review of patient's allergies indicates:  No Known Allergies  Current Outpatient Medications   Medication Sig     colchicine (COLCRYS) 0.6 mg tablet Take 1 tablet (0.6 mg total) by mouth once daily. for 7 days    methylPREDNISolone (MEDROL DOSEPACK) 4 mg tablet use as directed (Patient not taking: Reported on 10/25/2023)     No current facility-administered medications for this visit.           Review of Systems   Constitutional:  Negative for activity change, appetite change, chills, diaphoresis, fatigue, fever and unexpected weight change.   HENT:  Negative for congestion, ear pain, postnasal drip, rhinorrhea, sinus pressure, sinus pain, sneezing, sore throat, tinnitus, trouble swallowing and voice change.    Eyes:  Negative for photophobia, pain and visual disturbance.   Respiratory:  Negative for cough, chest tightness, shortness of breath and wheezing.    Cardiovascular:  Negative for chest pain, palpitations and leg swelling.   Gastrointestinal:  Negative for abdominal distention, abdominal pain, constipation, diarrhea, nausea and vomiting.   Genitourinary:  Negative for decreased urine volume, difficulty urinating, dysuria, flank pain, frequency, hematuria and urgency.   Musculoskeletal:  Negative for arthralgias, back pain, joint swelling, neck pain and neck stiffness.   Allergic/Immunologic: Negative for immunocompromised state.   Neurological:  Negative for dizziness, tremors, seizures, syncope, facial asymmetry, speech difficulty, weakness, light-headedness, numbness and headaches.   Hematological:  Negative for adenopathy. Does not bruise/bleed easily.   Psychiatric/Behavioral:  Negative for confusion and sleep disturbance.        Objective:      Physical Exam  HENT:      Head: Normocephalic and atraumatic.      Right Ear: Tympanic membrane normal.      Left Ear: Tympanic membrane normal.   Eyes:      Conjunctiva/sclera: Conjunctivae normal.   Cardiovascular:      Rate and Rhythm: Normal rate and regular rhythm.      Heart sounds: Normal heart sounds.   Pulmonary:      Effort: Pulmonary effort is normal.       Breath sounds: Normal breath sounds.   Abdominal:      General: Bowel sounds are normal.      Palpations: Abdomen is soft.   Musculoskeletal:         General: Normal range of motion.      Cervical back: Normal range of motion and neck supple.   Skin:     General: Skin is warm and dry.   Neurological:      Mental Status: He is alert and oriented to person, place, and time.         Assessment:     Vitals:    04/12/24 0902   BP: 130/82   Pulse: 70   Resp: 17   Temp: 96.4 °F (35.8 °C)         1. Follow-up exam    2. Fever, unspecified fever cause        Plan:   Follow-up exam    Fever, unspecified fever cause  -     CBC Auto Differential; Future; Expected date: 04/12/2024  -     Comprehensive Metabolic Panel; Future; Expected date: 04/12/2024  -     Cancel: Urinalysis; Future; Expected date: 04/12/2024  -     Urine culture; Future; Expected date: 04/12/2024  -     Urinalysis; Future; Expected date: 04/12/2024

## 2024-04-13 LAB — BACTERIA UR CULT: NO GROWTH

## 2024-04-16 DIAGNOSIS — Z00.00 ROUTINE GENERAL MEDICAL EXAMINATION AT A HEALTH CARE FACILITY: Primary | ICD-10-CM

## 2024-06-21 ENCOUNTER — HOSPITAL ENCOUNTER (OUTPATIENT)
Dept: CARDIOLOGY | Facility: HOSPITAL | Age: 49
Discharge: HOME | End: 2024-06-21
Payer: COMMERCIAL

## 2024-06-21 ENCOUNTER — CLINICAL SUPPORT (OUTPATIENT)
Dept: INTERNAL MEDICINE | Facility: CLINIC | Age: 49
End: 2024-06-21
Payer: COMMERCIAL

## 2024-06-21 ENCOUNTER — OFFICE VISIT (OUTPATIENT)
Dept: INTERNAL MEDICINE | Facility: CLINIC | Age: 49
End: 2024-06-21
Payer: COMMERCIAL

## 2024-06-21 VITALS
BODY MASS INDEX: 29.56 KG/M2 | WEIGHT: 211.19 LBS | SYSTOLIC BLOOD PRESSURE: 126 MMHG | OXYGEN SATURATION: 98 % | HEART RATE: 76 BPM | TEMPERATURE: 97 F | HEIGHT: 71 IN | DIASTOLIC BLOOD PRESSURE: 80 MMHG

## 2024-06-21 DIAGNOSIS — Z00.00 ROUTINE GENERAL MEDICAL EXAMINATION AT A HEALTH CARE FACILITY: ICD-10-CM

## 2024-06-21 DIAGNOSIS — K22.70 BARRETT'S ESOPHAGUS WITHOUT DYSPLASIA: Chronic | ICD-10-CM

## 2024-06-21 DIAGNOSIS — Z00.00 ROUTINE GENERAL MEDICAL EXAMINATION AT A HEALTH CARE FACILITY: Primary | ICD-10-CM

## 2024-06-21 LAB
ALBUMIN SERPL BCP-MCNC: 4.3 G/DL (ref 3.5–5.2)
ALP SERPL-CCNC: 68 U/L (ref 55–135)
ALT SERPL W/O P-5'-P-CCNC: 27 U/L (ref 10–44)
ANION GAP SERPL CALC-SCNC: 9 MMOL/L (ref 8–16)
AST SERPL-CCNC: 23 U/L (ref 10–40)
BILIRUB SERPL-MCNC: 0.6 MG/DL (ref 0.1–1)
BUN SERPL-MCNC: 14 MG/DL (ref 6–20)
CALCIUM SERPL-MCNC: 9.5 MG/DL (ref 8.7–10.5)
CHLORIDE SERPL-SCNC: 105 MMOL/L (ref 95–110)
CHOLEST SERPL-MCNC: 172 MG/DL (ref 120–199)
CHOLEST/HDLC SERPL: 4.6 {RATIO} (ref 2–5)
CO2 SERPL-SCNC: 26 MMOL/L (ref 23–29)
COMPLEXED PSA SERPL-MCNC: 1.5 NG/ML (ref 0–4)
CREAT SERPL-MCNC: 1.2 MG/DL (ref 0.5–1.4)
ERYTHROCYTE [DISTWIDTH] IN BLOOD BY AUTOMATED COUNT: 12.3 % (ref 11.5–14.5)
EST. GFR  (NO RACE VARIABLE): >60 ML/MIN/1.73 M^2
ESTIMATED AVG GLUCOSE: 105 MG/DL (ref 68–131)
GLUCOSE SERPL-MCNC: 101 MG/DL (ref 70–110)
HBA1C MFR BLD: 5.3 % (ref 4–5.6)
HCT VFR BLD AUTO: 42.2 % (ref 40–54)
HDLC SERPL-MCNC: 37 MG/DL (ref 40–75)
HDLC SERPL: 21.5 % (ref 20–50)
HGB BLD-MCNC: 14.5 G/DL (ref 14–18)
LDLC SERPL CALC-MCNC: 109.8 MG/DL (ref 63–159)
MCH RBC QN AUTO: 29.8 PG (ref 27–31)
MCHC RBC AUTO-ENTMCNC: 34.4 G/DL (ref 32–36)
MCV RBC AUTO: 87 FL (ref 82–98)
NONHDLC SERPL-MCNC: 135 MG/DL
PLATELET # BLD AUTO: 217 K/UL (ref 150–450)
PMV BLD AUTO: 10.6 FL (ref 9.2–12.9)
POTASSIUM SERPL-SCNC: 4.3 MMOL/L (ref 3.5–5.1)
PROT SERPL-MCNC: 7.1 G/DL (ref 6–8.4)
RBC # BLD AUTO: 4.87 M/UL (ref 4.6–6.2)
SODIUM SERPL-SCNC: 140 MMOL/L (ref 136–145)
TRIGL SERPL-MCNC: 126 MG/DL (ref 30–150)
TSH SERPL DL<=0.005 MIU/L-ACNC: 0.83 UIU/ML (ref 0.4–4)
WBC # BLD AUTO: 4.91 K/UL (ref 3.9–12.7)

## 2024-06-21 PROCEDURE — 93010 ELECTROCARDIOGRAM REPORT: CPT | Mod: ,,, | Performed by: INTERNAL MEDICINE

## 2024-06-21 PROCEDURE — 84153 ASSAY OF PSA TOTAL: CPT | Performed by: INTERNAL MEDICINE

## 2024-06-21 PROCEDURE — 93005 ELECTROCARDIOGRAM TRACING: CPT

## 2024-06-21 PROCEDURE — 3044F HG A1C LEVEL LT 7.0%: CPT | Mod: CPTII,S$GLB,, | Performed by: INTERNAL MEDICINE

## 2024-06-21 PROCEDURE — 3079F DIAST BP 80-89 MM HG: CPT | Mod: CPTII,S$GLB,, | Performed by: INTERNAL MEDICINE

## 2024-06-21 PROCEDURE — 99999 PR PBB SHADOW E&M-EST. PATIENT-LVL III: CPT | Mod: PBBFAC,,, | Performed by: INTERNAL MEDICINE

## 2024-06-21 PROCEDURE — 80061 LIPID PANEL: CPT | Performed by: INTERNAL MEDICINE

## 2024-06-21 PROCEDURE — 1159F MED LIST DOCD IN RCRD: CPT | Mod: CPTII,S$GLB,, | Performed by: INTERNAL MEDICINE

## 2024-06-21 PROCEDURE — 83036 HEMOGLOBIN GLYCOSYLATED A1C: CPT | Performed by: INTERNAL MEDICINE

## 2024-06-21 PROCEDURE — 80053 COMPREHEN METABOLIC PANEL: CPT | Performed by: INTERNAL MEDICINE

## 2024-06-21 PROCEDURE — 99396 PREV VISIT EST AGE 40-64: CPT | Mod: S$GLB,,, | Performed by: INTERNAL MEDICINE

## 2024-06-21 PROCEDURE — 85027 COMPLETE CBC AUTOMATED: CPT | Performed by: INTERNAL MEDICINE

## 2024-06-21 PROCEDURE — 3074F SYST BP LT 130 MM HG: CPT | Mod: CPTII,S$GLB,, | Performed by: INTERNAL MEDICINE

## 2024-06-21 PROCEDURE — 84443 ASSAY THYROID STIM HORMONE: CPT | Performed by: INTERNAL MEDICINE

## 2024-06-21 PROCEDURE — 3008F BODY MASS INDEX DOCD: CPT | Mod: CPTII,S$GLB,, | Performed by: INTERNAL MEDICINE

## 2024-06-21 RX ORDER — COLCHICINE 0.6 MG/1
0.6 TABLET ORAL DAILY
Qty: 7 TABLET | Refills: 0 | Status: SHIPPED | OUTPATIENT
Start: 2024-06-21 | End: 2024-06-28

## 2024-06-21 NOTE — PROGRESS NOTES
"Subjective:      Patient ID: Gideon Ribera Jr. is a 49 y.o. male.    Chief Complaint: [employer]    HPI    Pt requested refill on colcrys as going on vacation.     It was a pleasure to see you and perform your [employer] Health physical on 6/21/24.  At the time of your visit you are 49 years old.       Your past medical history is significant for Castano's esophagus, esophageal reflux, right bundle-branch block.  You take no regular medications, and have no medication allergies.     Your health maintenance includes 07/2016 tetanus diphtheria pertussis vaccination, 12/2021 colonoscopy with repeat due after 10 years, 07/2017 CT calcium score 0, 07/2022 exercise treadmill stress test negative.    Review of Systems   Constitutional:  Negative for chills and fever.   HENT:  Negative for ear pain and sore throat.    Respiratory:  Negative for cough.    Cardiovascular:  Negative for chest pain.   Gastrointestinal:  Negative for abdominal pain and blood in stool.   Genitourinary:  Negative for dysuria and hematuria.   Neurological:  Negative for seizures and syncope.     Objective:   /80 (BP Location: Right arm, Patient Position: Sitting, BP Method: Large (Manual))   Pulse 76   Temp 96.9 °F (36.1 °C) (Tympanic)   Ht 5' 11" (1.803 m)   Wt 95.8 kg (211 lb 3.2 oz)   SpO2 98%   BMI 29.46 kg/m²     Physical Exam  Constitutional:       General: He is not in acute distress.     Appearance: He is well-developed.   HENT:      Head: Normocephalic and atraumatic.   Eyes:      Extraocular Movements: Extraocular movements intact.   Neck:      Thyroid: No thyromegaly.   Cardiovascular:      Rate and Rhythm: Normal rate and regular rhythm.   Pulmonary:      Breath sounds: Normal breath sounds. No wheezing or rales.   Abdominal:      General: Bowel sounds are normal.      Palpations: Abdomen is soft.      Tenderness: There is no abdominal tenderness.   Musculoskeletal:         General: No swelling.      Cervical back: " Neck supple. No rigidity.   Lymphadenopathy:      Cervical: No cervical adenopathy.   Skin:     General: Skin is warm and dry.   Neurological:      Mental Status: He is alert and oriented to person, place, and time.   Psychiatric:         Behavior: Behavior normal.         Lab Results   Component Value Date    WBC 4.91 06/21/2024    HGB 14.5 06/21/2024    HGB 14.6 04/12/2024    HGB 13.7 (L) 10/02/2023    HCT 42.2 06/21/2024    MCV 87 06/21/2024    MCV 88 04/12/2024    MCV 86 10/02/2023     06/21/2024    CHOL 172 06/21/2024    TRIG 126 06/21/2024    HDL 37 (L) 06/21/2024    LDLCALC 109.8 06/21/2024    LDLCALC 119.2 06/28/2023    LDLCALC 117.2 07/21/2022    ALT 27 06/21/2024    AST 23 06/21/2024     06/21/2024    K 4.3 06/21/2024    CALCIUM 9.5 06/21/2024     06/21/2024    CO2 26 06/21/2024    BUN 14 06/21/2024    CREATININE 1.2 06/21/2024    CREATININE 1.2 04/12/2024    CREATININE 1.2 06/28/2023    EGFRNORACEVR >60 06/21/2024    EGFRNORACEVR >60.0 04/12/2024    EGFRNORACEVR >60 06/28/2023    TSH 0.830 06/21/2024    TSH 1.404 06/28/2023    TSH 0.894 07/21/2022    PSA 1.5 06/21/2024    PSA 1.5 06/28/2023    PSA 1.3 07/21/2022     06/21/2024    HGBA1C 5.3 06/21/2024    HGBA1C 5.3 06/28/2023    HGBA1C 5.6 07/21/2022          The 10-year ASCVD risk score (Antonio GRIFFIN, et al., 2019) is: 3.1%    Values used to calculate the score:      Age: 49 years      Sex: Male      Is Non- : No      Diabetic: No      Tobacco smoker: No      Systolic Blood Pressure: 126 mmHg      Is BP treated: No      HDL Cholesterol: 37 mg/dL      Total Cholesterol: 172 mg/dL     Assessment:     1. Routine general medical examination at a health care facility    2. Castano's esophagus without dysplasia      Plan:   1. Routine general medical examination at a health care facility  Overview:  Heart healthy diet, regular exercise, and regular use of sunscreen.   HM reviewed      2. Castano's esophagus  without dysplasia  Overview:  Repeat EGD due in 2026.       EGD  08/07/2020  FINDINGS: The esophagus and gastroesophageal junction were examined with white light and narrow band imaging (NBI) from a forward view and retroflexed position. There were esophageal mucosal changes consistent with short-segment Castano's esophagus. These changes involved the mucosa along an irregular Z-line (39 cm from the incisors). Circumferential salmon-colored mucosa was present from 39 to 40 cm and four tongues of salmon-colored mucosa were present from 40 to 41 cm. The maximum longitudinal extent of these esophageal mucosal changes was 2 cm in length. Mucosa was biopsied with a cold forceps for histology. A total of 2 specimen bottles were sent to pathology. LA Grade A (one or more mucosal breaks less than 5 mm, not extending between tops of 2 mucosal folds) esophagitis was found. The entire examined stomach was normal. The examined duodenum was normal. The cardia and gastric fundus were normal on retroflexion.   IMPRESSION:  - Esophageal mucosal changes consistent with short-segment Castano's esophagus. Biopsied  - LA Grade A reflux esophagitis  - Normal stomach  - Normal examined duodenum.   RECOMMENDATIONS: Repeat upper endoscopy in 3 years for surveillance  - Patient has a contact number available for emergencies. The signs and symptoms of potential delayed complications were discussed with the patient. Return to normal activities 08/14/2020. Written discharge instructions were provided to the patient  - Resume anticoagulant at prior dose.    EGD 07/27/2018 (Biopsy NEGATIVE for dysplasia.)  FINDINGS: The esophagus and gastroesophageal junction were examined with white light and narrow band imaging (NBI) from a forward view and retroflexed position. There were esophageal mucosal changes secondary to established short-segment Castano's disease. These changes involved the mucosa along an irregular Z-line (39 cm from the incisors).  Four tongues of salmon-colored mucosa were present from 39 to 40 cm and hiatal narrowing was identified at 45 cm. The maximum longitudinal extent of these esophageal mucosal changes was 1 cm in length. Mucosa was biopsied with a cold forceps for histology in a targeted manner 0.5 cm proximal to the GE junction. One specimen bottle was sent to pathology. Estimated blood loss was minimal. Patchy mild inflammation characterized by congestion (edema) and erythema was found in the gastric antrum and in the prepyloric region of the stomach. Biopsies were taken with a cold forceps for histology. Biopsies were taken with a cold forceps for Helicobacter pylori testing. Estimated blood loss was minimal. No other significant abnormalities were identified in a careful examination of the stomach. The duodenal bulb and second portion of the duodenum were normal.     Impression:   - Esophageal mucosal changes secondary to established short-segment Castano's disease. Biopsied.    - Gastritis. Biopsied.    - Normal duodenal bulb and second portion of the duodenum.   Recommendation: Repeat upper endoscopy in 2 years for surveillance.       Other orders  -     colchicine (COLCRYS) 0.6 mg tablet; Take 1 tablet (0.6 mg total) by mouth once daily. As needed for gout flair. for 7 days  Dispense: 7 tablet; Refill: 0        There are no Patient Instructions on file for this visit.    No future appointments.          No follow-ups on file.

## 2024-06-22 LAB
OHS QRS DURATION: 102 MS
OHS QTC CALCULATION: 386 MS

## 2024-06-23 LAB
OHS QRS DURATION: 102 MS
OHS QTC CALCULATION: 386 MS

## 2024-09-23 PROBLEM — Z00.00 ROUTINE GENERAL MEDICAL EXAMINATION AT A HEALTH CARE FACILITY: Status: RESOLVED | Noted: 2021-12-21 | Resolved: 2024-09-23

## 2025-03-05 ENCOUNTER — PATIENT MESSAGE (OUTPATIENT)
Dept: INTERNAL MEDICINE | Facility: CLINIC | Age: 50
End: 2025-03-05
Payer: COMMERCIAL

## 2025-03-05 NOTE — TELEPHONE ENCOUNTER
No care due was identified.  [facility] Embedded Care Due Messages. Reference number: [id number].   3/05/2025 9:24:00 AM CST

## 2025-03-06 RX ORDER — COLCHICINE 0.6 MG/1
0.6 TABLET ORAL DAILY
Qty: 7 TABLET | Refills: 4 | Status: SHIPPED | OUTPATIENT
Start: 2025-03-06 | End: 2025-04-10

## 2025-05-09 DIAGNOSIS — Z00.00 ROUTINE GENERAL MEDICAL EXAMINATION AT A HEALTH CARE FACILITY: Primary | ICD-10-CM

## 2025-05-13 DIAGNOSIS — M25.561 RIGHT KNEE PAIN, UNSPECIFIED CHRONICITY: Primary | ICD-10-CM

## 2025-05-14 ENCOUNTER — HOSPITAL ENCOUNTER (OUTPATIENT)
Dept: RADIOLOGY | Facility: HOSPITAL | Age: 50
Discharge: HOME | End: 2025-05-14
Attending: ORTHOPAEDIC SURGERY
Payer: COMMERCIAL

## 2025-05-14 ENCOUNTER — OFFICE VISIT (OUTPATIENT)
Dept: ORTHOPEDICS | Facility: CLINIC | Age: 50
End: 2025-05-14
Payer: COMMERCIAL

## 2025-05-14 VITALS — BODY MASS INDEX: 29.56 KG/M2 | HEIGHT: 71 IN | WEIGHT: 211.19 LBS

## 2025-05-14 DIAGNOSIS — M25.561 RIGHT KNEE PAIN, UNSPECIFIED CHRONICITY: ICD-10-CM

## 2025-05-14 DIAGNOSIS — M76.891 TENDINITIS OF RIGHT QUADRICEPS TENDON: Primary | ICD-10-CM

## 2025-05-14 PROCEDURE — 99999 PR PBB SHADOW E&M-EST. PATIENT-LVL III: CPT | Mod: PBBFAC,,,

## 2025-05-14 PROCEDURE — 73564 X-RAY EXAM KNEE 4 OR MORE: CPT | Mod: TC,RT

## 2025-05-14 PROCEDURE — 73564 X-RAY EXAM KNEE 4 OR MORE: CPT | Mod: 26,RT,, | Performed by: RADIOLOGY

## 2025-05-14 RX ORDER — IBUPROFEN 200 MG
200 TABLET ORAL EVERY 6 HOURS PRN
COMMUNITY
End: 2025-05-14 | Stop reason: ALTCHOICE

## 2025-05-14 RX ORDER — NABUMETONE 500 MG/1
500 TABLET, FILM COATED ORAL 2 TIMES DAILY PRN
Qty: 60 TABLET | Refills: 0 | Status: SHIPPED | OUTPATIENT
Start: 2025-05-14

## 2025-05-14 NOTE — PROGRESS NOTES
Patient ID: Gideon Ribera Jr. is a 50 y.o. male.     Chief Complaint: Pain of the Right Knee (6/10/)      Patient Instructions     Encounter Diagnoses   Name Primary?    Tendinitis of right quadriceps tendon Yes    Right knee pain, unspecified chronicity        ASSESSMENT:  Right quad tendonitis that is improving    TREATMENT/PLAN:  Start nabumetone 500 BID PRN   Continue ice compresses as needed  Discussed potential physical therapy   Follow up 4 weeks with Dr. Lerner.  May cancel if symptoms resolved.      HPI: Gideon Ribera Jr. is a 50 y.o. male.Patient presents today for evaluation of his right knee.  He reports that 1 week ago he started with pain over the superior, anterior aspect of his right knee.  He denies any specific injury or trauma.  He does report that he lifts weights and did some squats and lifting on the weekend prior to onset.  Denies any numbness, weakness, tingling.  He reports that his pain is steadily improving, rating it as 6/10 today.  He tends to only experienced the pain with various activity and exerting the quad muscle.  He has taken ibuprofen PRN with relief.      XRAY: EXAM: XR KNEE ORTHO RIGHT WITH FLEXION (XPD)  CLINICAL HISTORY:  Pain in right knee.  TECHNIQUE: Right knee x-ray 4 views.  COMPARISON: None.  FINDINGS:  There is no fracture, dislocation, or joint effusion.  Joint spaces are well-preserved with smooth articular surfaces.  Minimal anterior knee soft tissue swelling.  Patellar enthesophyte.     Impression:   See above.  Finalized on: 5/14/2025 5:53 PM By:  Pino Ann MD  [facility]# [id number]      2025-05-14 17:[id number]     [facility]    PHYSICAL EXAMINATION:  Body mass index is 29.46 kg/m².,     GENERAL:  Pleasant and cooperative with normal work of breathing    EXTREMITY:   Right knee:   Tenderness to palpation over superior border of patella/quad   Increased pain with resistance to knee extension   Full strength grossly   Range of motion right knee 0-130   No effusion,  erythema, rashes, wounds   Neurovascular status grossly intact   Calf soft      MEDICATIONS: Current Medications[1]    ALLERGIES:   Review of patient's allergies indicates:  No Known Allergies    MEDICAL HISTORY:   Past Medical History:   Diagnosis Date    Castano esophagus     last egd - 2014 looks normal    GERD (gastroesophageal reflux disease)     Right bundle branch block 6/26/2020    Sinus bradycardia 6/26/2020       SURGICAL HISTORY:   Past Surgical History:   Procedure Laterality Date    COLONOSCOPY N/A 12/21/2021    Procedure: COLONOSCOPY;  Surgeon: Mateo Shah MD;  Location: [facility];  Service: Endoscopy;  Laterality: N/A;    ESOPHAGOGASTRODUODENOSCOPY N/A 7/27/2018    Procedure: ESOPHAGOGASTRODUODENOSCOPY (EGD);  Surgeon: Gerardo Stockton III, MD;  Location: [facility];  Service: Endoscopy;  Laterality: N/A;    ESOPHAGOGASTRODUODENOSCOPY N/A 8/7/2020    Procedure: ESOPHAGOGASTRODUODENOSCOPY (EGD);  Surgeon: Salud Dale MD;  Location: [facility];  Service: Endoscopy;  Laterality: N/A;    ESOPHAGOGASTRODUODENOSCOPY N/A 8/4/2023    Procedure: EGD (ESOPHAGOGASTRODUODENOSCOPY);  Surgeon: Bert Kearns MD;  Location: [facility];  Service: Gastroenterology;  Laterality: N/A;       REVIEW OF SYSTEMS:   No fevers, chills, sweats, chest pain or shortness of breath.      SOCIAL HISTORY:   Social History     Occupational History    Occupation: shell     Employer: [employer]     Comment:    Tobacco Use    Smoking status: Never     Passive exposure: Never    Smokeless tobacco: Never   Substance and Sexual Activity    Alcohol use: Yes     Alcohol/week: 2.0 standard drinks of alcohol     Types: 2 Cans of beer per week     Comment: once a week    Drug use: No    Sexual activity: Yes     Partners: Female         Joe Hodgson PA-C   Orthopedic Surgery          [1]   Current Outpatient Medications:     colchicine (COLCRYS) 0.6 mg tablet, Take 1 tablet (0.6 mg total) by mouth once daily.  As needed for gout flair., Disp: 7 tablet, Rfl: 4    nabumetone (RELAFEN) 500 MG tablet, Take 1 tablet (500 mg total) by mouth 2 (two) times daily as needed for Pain., Disp: 60 tablet, Rfl: 0

## 2025-05-15 NOTE — PATIENT INSTRUCTIONS
Encounter Diagnoses   Name Primary?    Tendinitis of right quadriceps tendon Yes    Right knee pain, unspecified chronicity        ASSESSMENT:  Right quad tendonitis that is improving    TREATMENT/PLAN:  Start nabumetone 500 BID PRN   Continue ice compresses as needed  Discussed potential physical therapy   Follow up 4 weeks with Dr. Lerner.  May cancel if symptoms resolved.

## 2025-07-03 ENCOUNTER — OFFICE VISIT (OUTPATIENT)
Dept: INTERNAL MEDICINE | Facility: CLINIC | Age: 50
End: 2025-07-03
Payer: COMMERCIAL

## 2025-07-03 ENCOUNTER — CLINICAL SUPPORT (OUTPATIENT)
Dept: INTERNAL MEDICINE | Facility: CLINIC | Age: 50
End: 2025-07-03
Payer: COMMERCIAL

## 2025-07-03 ENCOUNTER — HOSPITAL ENCOUNTER (OUTPATIENT)
Dept: RADIOLOGY | Facility: HOSPITAL | Age: 50
Discharge: HOME | End: 2025-07-03
Attending: INTERNAL MEDICINE
Payer: COMMERCIAL

## 2025-07-03 VITALS
SYSTOLIC BLOOD PRESSURE: 119 MMHG | WEIGHT: 216 LBS | TEMPERATURE: 98 F | HEIGHT: 71 IN | HEART RATE: 64 BPM | DIASTOLIC BLOOD PRESSURE: 76 MMHG | BODY MASS INDEX: 30.24 KG/M2

## 2025-07-03 DIAGNOSIS — Z00.00 ROUTINE GENERAL MEDICAL EXAMINATION AT A HEALTH CARE FACILITY: Primary | ICD-10-CM

## 2025-07-03 DIAGNOSIS — Z00.00 ROUTINE GENERAL MEDICAL EXAMINATION AT A HEALTH CARE FACILITY: ICD-10-CM

## 2025-07-03 DIAGNOSIS — M10.9 GOUT, UNSPECIFIED CAUSE, UNSPECIFIED CHRONICITY, UNSPECIFIED SITE: ICD-10-CM

## 2025-07-03 DIAGNOSIS — R93.1 AGATSTON CORONARY ARTERY CALCIUM SCORE LESS THAN 100: ICD-10-CM

## 2025-07-03 LAB
ALBUMIN SERPL BCP-MCNC: 4.6 G/DL (ref 3.5–5.2)
ALP SERPL-CCNC: 70 UNIT/L (ref 40–150)
ALT SERPL W/O P-5'-P-CCNC: 36 UNIT/L (ref 10–44)
ANION GAP (OHS): 12 MMOL/L (ref 8–16)
AST SERPL-CCNC: 23 UNIT/L (ref 11–45)
BILIRUB SERPL-MCNC: 0.6 MG/DL (ref 0.1–1)
BUN SERPL-MCNC: 16 MG/DL (ref 6–20)
CALCIUM SERPL-MCNC: 9.3 MG/DL (ref 8.7–10.5)
CHLORIDE SERPL-SCNC: 102 MMOL/L (ref 95–110)
CHOLEST SERPL-MCNC: 196 MG/DL (ref 120–199)
CHOLEST/HDLC SERPL: 4.5 {RATIO} (ref 2–5)
CO2 SERPL-SCNC: 24 MMOL/L (ref 23–29)
CREAT SERPL-MCNC: 1.2 MG/DL (ref 0.5–1.4)
CRP SERPL-MCNC: 0.98 MG/L
EAG (OHS): 108 MG/DL (ref 68–131)
ERYTHROCYTE [DISTWIDTH] IN BLOOD BY AUTOMATED COUNT: 12.3 % (ref 11.5–14.5)
GFR SERPLBLD CREATININE-BSD FMLA CKD-EPI: >60 ML/MIN/1.73/M2
GLUCOSE SERPL-MCNC: 108 MG/DL (ref 70–110)
HBA1C MFR BLD: 5.4 % (ref 4–5.6)
HCT VFR BLD AUTO: 45.3 % (ref 40–54)
HDLC SERPL-MCNC: 44 MG/DL (ref 40–75)
HDLC SERPL: 22.4 % (ref 20–50)
HGB BLD-MCNC: 15.4 GM/DL (ref 14–18)
HOLD SPECIMEN: NORMAL
LDLC SERPL CALC-MCNC: 118.6 MG/DL (ref 63–159)
MCH RBC QN AUTO: 29.7 PG (ref 27–31)
MCHC RBC AUTO-ENTMCNC: 34 G/DL (ref 32–36)
MCV RBC AUTO: 88 FL (ref 82–98)
NONHDLC SERPL-MCNC: 152 MG/DL
PLATELET # BLD AUTO: 229 K/UL (ref 150–450)
PMV BLD AUTO: 10.3 FL (ref 9.2–12.9)
POTASSIUM SERPL-SCNC: 4 MMOL/L (ref 3.5–5.1)
PROT SERPL-MCNC: 8 GM/DL (ref 6–8.4)
PSA SERPL-MCNC: 1.37 NG/ML
RBC # BLD AUTO: 5.18 M/UL (ref 4.6–6.2)
SODIUM SERPL-SCNC: 138 MMOL/L (ref 136–145)
TRIGL SERPL-MCNC: 167 MG/DL (ref 30–150)
TSH SERPL-ACNC: 0.94 UIU/ML (ref 0.4–4)
WBC # BLD AUTO: 6.64 K/UL (ref 3.9–12.7)

## 2025-07-03 PROCEDURE — 84443 ASSAY THYROID STIM HORMONE: CPT

## 2025-07-03 PROCEDURE — 83695 ASSAY OF LIPOPROTEIN(A): CPT

## 2025-07-03 PROCEDURE — 99999 PR PBB SHADOW E&M-EST. PATIENT-LVL III: CPT | Mod: PBBFAC,,, | Performed by: INTERNAL MEDICINE

## 2025-07-03 PROCEDURE — 75571 CT HRT W/O DYE W/CA TEST: CPT | Mod: TC

## 2025-07-03 PROCEDURE — 80053 COMPREHEN METABOLIC PANEL: CPT

## 2025-07-03 PROCEDURE — 86141 C-REACTIVE PROTEIN HS: CPT

## 2025-07-03 PROCEDURE — 75571 CT HRT W/O DYE W/CA TEST: CPT | Mod: 26,,, | Performed by: RADIOLOGY

## 2025-07-03 PROCEDURE — 85027 COMPLETE CBC AUTOMATED: CPT

## 2025-07-03 PROCEDURE — 80061 LIPID PANEL: CPT

## 2025-07-03 PROCEDURE — 83036 HEMOGLOBIN GLYCOSYLATED A1C: CPT

## 2025-07-03 PROCEDURE — 82172 ASSAY OF APOLIPOPROTEIN: CPT

## 2025-07-03 RX ORDER — ROSUVASTATIN CALCIUM 10 MG/1
10 TABLET, COATED ORAL DAILY
Qty: 90 TABLET | Refills: 3 | Status: SHIPPED | OUTPATIENT
Start: 2025-07-03 | End: 2026-07-03

## 2025-07-03 RX ORDER — COLCHICINE 0.6 MG/1
0.6 TABLET ORAL DAILY
Qty: 30 TABLET | Refills: 0 | Status: SHIPPED | OUTPATIENT
Start: 2025-07-03

## 2025-07-03 NOTE — PROGRESS NOTES
"Subjective:      Patient ID: Gideon Ribera Jr. is a 50 y.o. male.    Chief Complaint: [facility]    HPI  History of Present Illness               It was a pleasure to see you and perform your [facility] physical on 7/3/25.  At the time of your visit you are 50 years old.       Your past medical history is significant for Castano's esophagus, esophageal reflux, right bundle-branch block.  You take no regular medications, and have no medication allergies.    Medications: colchicine prn.      Your health maintenance includes 07/2016 tetanus diphtheria pertussis vaccination, 12/2021 colonoscopy with repeat due after 10 years, 07/2017 CT calcium score 0, 07/2022 exercise treadmill stress test negative.  EGD for Castano's esophagus due September of 2026.          Review of Systems   Constitutional:  Negative for chills and fever.   HENT:  Negative for ear pain and sore throat.    Respiratory:  Negative for cough.    Cardiovascular:  Negative for chest pain.   Gastrointestinal:  Negative for abdominal pain and blood in stool.   Genitourinary:  Negative for dysuria and hematuria.   Neurological:  Negative for seizures and syncope.     Objective:   /76   Pulse 64   Temp 97.8 °F (36.6 °C)   Ht 5' 11" (1.803 m)   Wt 98 kg (216 lb)   BMI 30.13 kg/m²     Physical Exam  Constitutional:       General: He is not in acute distress.     Appearance: He is well-developed.   HENT:      Head: Normocephalic and atraumatic.   Eyes:      Extraocular Movements: Extraocular movements intact.   Neck:      Thyroid: No thyromegaly.   Cardiovascular:      Rate and Rhythm: Normal rate and regular rhythm.   Pulmonary:      Breath sounds: Normal breath sounds. No wheezing or rales.   Abdominal:      General: Bowel sounds are normal.      Palpations: Abdomen is soft.      Tenderness: There is no abdominal tenderness.   Musculoskeletal:         General: No swelling.      Cervical back: Neck supple. No rigidity. "   Lymphadenopathy:      Cervical: No cervical adenopathy.   Skin:     General: Skin is warm and dry.   Neurological:      Mental Status: He is alert and oriented to person, place, and time.   Psychiatric:         Behavior: Behavior normal.         Lab Results   Component Value Date    WBC 6.64 07/03/2025    HGB 15.4 07/03/2025    HGB 14.5 06/21/2024    HGB 14.6 04/12/2024    HCT 45.3 07/03/2025    MCV 88 07/03/2025    MCV 87 06/21/2024    MCV 88 04/12/2024     07/03/2025    CHOL 196 07/03/2025    TRIG 167 (H) 07/03/2025    HDL 44 07/03/2025    LDLCALC 118.6 07/03/2025    LDLCALC 109.8 06/21/2024    LDLCALC 119.2 06/28/2023    ALT 36 07/03/2025    AST 23 07/03/2025     07/03/2025    K 4.0 07/03/2025    CALCIUM 9.3 07/03/2025     07/03/2025    CO2 24 07/03/2025    BUN 16 07/03/2025    CREATININE 1.2 07/03/2025    CREATININE 1.2 06/21/2024    CREATININE 1.2 04/12/2024    EGFRNORACEVR >60 07/03/2025    EGFRNORACEVR >60 06/21/2024    EGFRNORACEVR >60.0 04/12/2024    TSH 0.830 06/21/2024    TSH 1.404 06/28/2023    TSH 0.894 07/21/2022    PSA 1.5 06/21/2024    PSA 1.5 06/28/2023    PSA 1.3 07/21/2022     07/03/2025    HGBA1C 5.3 06/21/2024    HGBA1C 5.3 06/28/2023    HGBA1C 5.6 07/21/2022          The 10-year ASCVD risk score (Antonio GRIFFIN, et al., 2019) is: 3.1%    Values used to calculate the score:      Age: 50 years      Sex: Male      Is Non- : No      Diabetic: No      Tobacco smoker: No      Systolic Blood Pressure: 119 mmHg      Is BP treated: No      HDL Cholesterol: 44 mg/dL      Total Cholesterol: 196 mg/dL     Assessment:     1. Routine general medical examination at a health care facility    2. Gout, unspecified cause, unspecified chronicity, unspecified site    3. Agatston coronary artery calcium score less than 100      Plan:   1. Routine general medical examination at a health care facility  Heart healthy diet and regular exercise.  Regular use of sunscreen.   Health maintenance reviewed.  -     Apolipoprotein B; Future; Expected date: 07/03/2025  -     Lipoprotein A (LPA); Future; Expected date: 07/03/2025  -     CRP, High Sensitivity; Future; Expected date: 07/03/2025    2. Gout, unspecified cause, unspecified chronicity, unspecified site  Admits to some intermittent flares.  Usually mild.  Colchicine for 3 days usually resolved symptoms.  He has run out of his colchicine.  -     Uric Acid; Future; Expected date: 07/03/2025  -     colchicine (COLCRYS) 0.6 mg tablet; Take 1 tablet (0.6 mg total) by mouth once daily. 1.2 mg po followed by 0.6 mg one hour later.  Dispense: 30 tablet; Refill: 0    Plan to start Allopurinol if uric acid level not at goal.    3. Agatston coronary artery calcium score less than 100  New diagnosis.  Start rosuvastatin.  -     rosuvastatin (CRESTOR) 10 MG tablet; Take 1 tablet (10 mg total) by mouth once daily.  Dispense: 90 tablet; Refill: 3  -     Lipid Panel; Future; Expected date: 10/01/2025  -     ALT (SGPT); Future; Expected date: 10/03/2025        There are no Patient Instructions on file for this visit.    Future Appointments   Date Time Provider Department Center   10/1/2025  8:15 AM LABORATORY, [facility]   10/8/2025  7:40 AM Kevin Melton MD [facility] [place]           Follow up in about 3 months (around 10/3/2025), or if symptoms worsen or fail to improve.

## 2025-07-07 ENCOUNTER — PATIENT MESSAGE (OUTPATIENT)
Dept: INTERNAL MEDICINE | Facility: CLINIC | Age: 50
End: 2025-07-07
Payer: COMMERCIAL

## 2025-07-17 ENCOUNTER — LAB VISIT (OUTPATIENT)
Dept: LAB | Facility: HOSPITAL | Age: 50
End: 2025-07-17
Attending: INTERNAL MEDICINE
Payer: COMMERCIAL

## 2025-07-17 DIAGNOSIS — M10.9 GOUT, UNSPECIFIED CAUSE, UNSPECIFIED CHRONICITY, UNSPECIFIED SITE: ICD-10-CM

## 2025-07-17 LAB — URATE SERPL-MCNC: 9.1 MG/DL (ref 3.4–7)

## 2025-07-17 PROCEDURE — 84550 ASSAY OF BLOOD/URIC ACID: CPT

## 2025-07-17 PROCEDURE — 36415 COLL VENOUS BLD VENIPUNCTURE: CPT
